# Patient Record
Sex: FEMALE
[De-identification: names, ages, dates, MRNs, and addresses within clinical notes are randomized per-mention and may not be internally consistent; named-entity substitution may affect disease eponyms.]

---

## 2017-06-20 ENCOUNTER — HOSPITAL ENCOUNTER (INPATIENT)
Dept: HOSPITAL 14 - H.ER | Age: 57
LOS: 13 days | Discharge: HOME | DRG: 26 | End: 2017-07-03
Attending: FAMILY MEDICINE | Admitting: FAMILY MEDICINE
Payer: MEDICARE

## 2017-06-20 VITALS — BODY MASS INDEX: 33.7 KG/M2

## 2017-06-20 DIAGNOSIS — F41.8: ICD-10-CM

## 2017-06-20 DIAGNOSIS — E11.622: ICD-10-CM

## 2017-06-20 DIAGNOSIS — R29.6: ICD-10-CM

## 2017-06-20 DIAGNOSIS — J44.9: ICD-10-CM

## 2017-06-20 DIAGNOSIS — L97.519: ICD-10-CM

## 2017-06-20 DIAGNOSIS — Z79.82: ICD-10-CM

## 2017-06-20 DIAGNOSIS — L97.419: ICD-10-CM

## 2017-06-20 DIAGNOSIS — N39.0: ICD-10-CM

## 2017-06-20 DIAGNOSIS — E78.00: ICD-10-CM

## 2017-06-20 DIAGNOSIS — I65.23: Primary | ICD-10-CM

## 2017-06-20 DIAGNOSIS — I50.9: ICD-10-CM

## 2017-06-20 DIAGNOSIS — Z81.8: ICD-10-CM

## 2017-06-20 DIAGNOSIS — Z90.49: ICD-10-CM

## 2017-06-20 DIAGNOSIS — K21.9: ICD-10-CM

## 2017-06-20 DIAGNOSIS — Z82.49: ICD-10-CM

## 2017-06-20 DIAGNOSIS — E11.51: ICD-10-CM

## 2017-06-20 DIAGNOSIS — Z82.5: ICD-10-CM

## 2017-06-20 DIAGNOSIS — Z83.3: ICD-10-CM

## 2017-06-20 DIAGNOSIS — E11.65: ICD-10-CM

## 2017-06-20 DIAGNOSIS — E11.69: ICD-10-CM

## 2017-06-20 DIAGNOSIS — Z80.0: ICD-10-CM

## 2017-06-20 DIAGNOSIS — Z79.02: ICD-10-CM

## 2017-06-20 DIAGNOSIS — E11.621: ICD-10-CM

## 2017-06-20 DIAGNOSIS — E11.649: ICD-10-CM

## 2017-06-20 DIAGNOSIS — Z79.4: ICD-10-CM

## 2017-06-20 DIAGNOSIS — E07.81: ICD-10-CM

## 2017-06-20 DIAGNOSIS — E66.9: ICD-10-CM

## 2017-06-20 DIAGNOSIS — Z79.899: ICD-10-CM

## 2017-06-20 DIAGNOSIS — E11.319: ICD-10-CM

## 2017-06-20 DIAGNOSIS — N17.9: ICD-10-CM

## 2017-06-20 DIAGNOSIS — E86.0: ICD-10-CM

## 2017-06-20 DIAGNOSIS — Z88.1: ICD-10-CM

## 2017-06-20 DIAGNOSIS — M86.8X7: ICD-10-CM

## 2017-06-20 DIAGNOSIS — I13.0: ICD-10-CM

## 2017-06-20 DIAGNOSIS — E11.22: ICD-10-CM

## 2017-06-20 DIAGNOSIS — E11.42: ICD-10-CM

## 2017-06-20 DIAGNOSIS — F17.210: ICD-10-CM

## 2017-06-20 DIAGNOSIS — I25.10: ICD-10-CM

## 2017-06-20 DIAGNOSIS — L08.9: ICD-10-CM

## 2017-06-20 LAB
ALBUMIN SERPL-MCNC: 3.3 G/DL (ref 3.5–5)
ALBUMIN SERPL-MCNC: 3.9 G/DL (ref 3.5–5)
ALBUMIN/GLOB SERPL: 0.9 {RATIO} (ref 1–2.1)
ALBUMIN/GLOB SERPL: 1 {RATIO} (ref 1–2.1)
ALT SERPL-CCNC: 36 U/L (ref 9–52)
ALT SERPL-CCNC: 43 U/L (ref 9–52)
APTT BLD: 36.5 SECONDS (ref 25.6–37.1)
AST SERPL-CCNC: 24 U/L (ref 14–36)
AST SERPL-CCNC: 32 U/L (ref 14–36)
BACTERIA #/AREA URNS HPF: (no result) /[HPF]
BASOPHILS # BLD AUTO: 0.2 K/UL (ref 0–0.2)
BASOPHILS NFR BLD: 1 % (ref 0–2)
BILIRUB UR-MCNC: NEGATIVE MG/DL
BUN SERPL-MCNC: 60 MG/DL (ref 7–17)
BUN SERPL-MCNC: 67 MG/DL (ref 7–17)
CALCIUM SERPL-MCNC: 8.3 MG/DL (ref 8.4–10.2)
CALCIUM SERPL-MCNC: 9.3 MG/DL (ref 8.4–10.2)
COLOR UR: YELLOW
EOSINOPHIL # BLD AUTO: 0.1 K/UL (ref 0–0.7)
EOSINOPHIL NFR BLD: 0.9 % (ref 0–4)
ERYTHROCYTE [DISTWIDTH] IN BLOOD BY AUTOMATED COUNT: 14.4 % (ref 11.5–14.5)
ERYTHROCYTE [DISTWIDTH] IN BLOOD BY AUTOMATED COUNT: 15.3 % (ref 11.5–14.5)
GFR NON-AFRICAN AMERICAN: 18
GFR NON-AFRICAN AMERICAN: 24
GLUCOSE UR STRIP-MCNC: >=500 MG/DL
HGB BLD-MCNC: 10.2 G/DL (ref 12–16)
HGB BLD-MCNC: 9.6 G/DL (ref 12–16)
INR PPP: 1.1 (ref 0.9–1.2)
LEUKOCYTE ESTERASE UR-ACNC: (no result) LEU/UL
LYMPHOCYTES # BLD AUTO: 2.5 K/UL (ref 1–4.3)
LYMPHOCYTES NFR BLD AUTO: 15.4 % (ref 20–40)
MCH RBC QN AUTO: 29.5 PG (ref 27–31)
MCH RBC QN AUTO: 30.7 PG (ref 27–31)
MCHC RBC AUTO-ENTMCNC: 31.3 G/DL (ref 33–37)
MCHC RBC AUTO-ENTMCNC: 33.3 G/DL (ref 33–37)
MCV RBC AUTO: 92.3 FL (ref 81–99)
MCV RBC AUTO: 94.1 FL (ref 81–99)
MONOCYTES # BLD: 1.5 K/UL (ref 0–0.8)
MONOCYTES NFR BLD: 9 % (ref 0–10)
NEUTROPHILS # BLD: 12.2 K/UL (ref 1.8–7)
NEUTROPHILS NFR BLD AUTO: 73.7 % (ref 50–75)
NRBC BLD AUTO-RTO: 0 % (ref 0–0)
PH UR STRIP: 5 [PH] (ref 5–8)
PLATELET # BLD: 172 K/UL (ref 130–400)
PLATELET # BLD: 219 K/UL (ref 130–400)
PMV BLD AUTO: 10.2 FL (ref 7.2–11.7)
PMV BLD AUTO: 10.4 FL (ref 7.2–11.7)
PROT UR STRIP-MCNC: 30 MG/DL
PROTHROMBIN TIME: 12.4 SECONDS (ref 9.8–13.1)
RBC # BLD AUTO: 3.25 MIL/UL (ref 3.8–5.2)
RBC # BLD AUTO: 3.31 MIL/UL (ref 3.8–5.2)
RBC # UR STRIP: (no result) /UL
SP GR UR STRIP: 1.01 (ref 1–1.03)
SQUAMOUS EPITHIAL: 1 /HPF (ref 0–5)
TROPONIN I SERPL-MCNC: 0.03 NG/ML (ref 0–0.12)
URINE CLARITY: (no result)
URINE HYALINE CAST: (no result) /HPF (ref 0–2)
URINE NITRATE: NEGATIVE
UROBILINOGEN UR-MCNC: (no result) MG/DL (ref 0.2–1)
WBC # BLD AUTO: 11.4 K/UL (ref 4.8–10.8)
WBC # BLD AUTO: 16.6 K/UL (ref 4.8–10.8)
WBC CLUMPS # UR AUTO: (no result) /HPF

## 2017-06-21 LAB
ALBUMIN SERPL-MCNC: 3.3 G/DL (ref 3.5–5)
ALBUMIN/GLOB SERPL: 0.8 {RATIO} (ref 1–2.1)
ALT SERPL-CCNC: 45 U/L (ref 9–52)
AST SERPL-CCNC: 34 U/L (ref 14–36)
BASOPHILS # BLD AUTO: 0 K/UL (ref 0–0.2)
BASOPHILS NFR BLD: 0.5 % (ref 0–2)
BUN SERPL-MCNC: 40 MG/DL (ref 7–17)
CALCIUM SERPL-MCNC: 8.5 MG/DL (ref 8.4–10.2)
EOSINOPHIL # BLD AUTO: 0.1 K/UL (ref 0–0.7)
EOSINOPHIL NFR BLD: 1.2 % (ref 0–4)
ERYTHROCYTE [DISTWIDTH] IN BLOOD BY AUTOMATED COUNT: 14.7 % (ref 11.5–14.5)
GFR NON-AFRICAN AMERICAN: 42
HGB BLD-MCNC: 9.8 G/DL (ref 12–16)
LYMPHOCYTES # BLD AUTO: 1.4 K/UL (ref 1–4.3)
LYMPHOCYTES NFR BLD AUTO: 16.3 % (ref 20–40)
MCH RBC QN AUTO: 30.8 PG (ref 27–31)
MCHC RBC AUTO-ENTMCNC: 33.3 G/DL (ref 33–37)
MCV RBC AUTO: 92.5 FL (ref 81–99)
MONOCYTES # BLD: 0.7 K/UL (ref 0–0.8)
MONOCYTES NFR BLD: 8.8 % (ref 0–10)
NEUTROPHILS # BLD: 6.1 K/UL (ref 1.8–7)
NEUTROPHILS NFR BLD AUTO: 73.2 % (ref 50–75)
NRBC BLD AUTO-RTO: 0.1 % (ref 0–0)
PLATELET # BLD: 175 K/UL (ref 130–400)
PMV BLD AUTO: 10.5 FL (ref 7.2–11.7)
RBC # BLD AUTO: 3.2 MIL/UL (ref 3.8–5.2)
WBC # BLD AUTO: 8.3 K/UL (ref 4.8–10.8)

## 2017-06-21 RX ADMIN — INSULIN DETEMIR SCH: 100 INJECTION, SOLUTION SUBCUTANEOUS at 22:00

## 2017-06-21 RX ADMIN — INSULIN LISPRO SCH: 100 INJECTION, SOLUTION INTRAVENOUS; SUBCUTANEOUS at 18:24

## 2017-06-21 RX ADMIN — INSULIN LISPRO SCH U: 100 INJECTION, SOLUTION INTRAVENOUS; SUBCUTANEOUS at 18:23

## 2017-06-21 RX ADMIN — INSULIN LISPRO SCH UNITS: 100 INJECTION, SOLUTION INTRAVENOUS; SUBCUTANEOUS at 06:37

## 2017-06-21 RX ADMIN — INSULIN LISPRO SCH: 100 INJECTION, SOLUTION INTRAVENOUS; SUBCUTANEOUS at 21:35

## 2017-06-21 RX ADMIN — INSULIN LISPRO SCH UNITS: 100 INJECTION, SOLUTION INTRAVENOUS; SUBCUTANEOUS at 12:57

## 2017-06-21 RX ADMIN — Medication SCH APPLIC: at 18:25

## 2017-06-22 LAB
ALBUMIN SERPL-MCNC: 3.7 G/DL (ref 3.5–5)
ALBUMIN/GLOB SERPL: 0.9 {RATIO} (ref 1–2.1)
ALT SERPL-CCNC: 51 U/L (ref 9–52)
AST SERPL-CCNC: 42 U/L (ref 14–36)
BUN SERPL-MCNC: 21 MG/DL (ref 7–17)
CALCIUM SERPL-MCNC: 9.2 MG/DL (ref 8.4–10.2)
GFR NON-AFRICAN AMERICAN: > 60
HDLC SERPL-MCNC: 27 MG/DL (ref 30–70)
LDLC SERPL-MCNC: 93 MG/DL (ref 0–129)

## 2017-06-22 RX ADMIN — INSULIN LISPRO SCH: 100 INJECTION, SOLUTION INTRAVENOUS; SUBCUTANEOUS at 11:30

## 2017-06-22 RX ADMIN — INSULIN DETEMIR SCH UNITS: 100 INJECTION, SOLUTION SUBCUTANEOUS at 22:13

## 2017-06-22 RX ADMIN — INSULIN LISPRO SCH U: 100 INJECTION, SOLUTION INTRAVENOUS; SUBCUTANEOUS at 08:59

## 2017-06-22 RX ADMIN — INSULIN LISPRO SCH U: 100 INJECTION, SOLUTION INTRAVENOUS; SUBCUTANEOUS at 16:37

## 2017-06-22 RX ADMIN — Medication SCH APPLIC: at 09:00

## 2017-06-22 RX ADMIN — Medication SCH APPLIC: at 16:39

## 2017-06-22 RX ADMIN — INSULIN LISPRO SCH: 100 INJECTION, SOLUTION INTRAVENOUS; SUBCUTANEOUS at 06:51

## 2017-06-22 RX ADMIN — INSULIN LISPRO SCH: 100 INJECTION, SOLUTION INTRAVENOUS; SUBCUTANEOUS at 22:13

## 2017-06-22 RX ADMIN — INSULIN LISPRO SCH: 100 INJECTION, SOLUTION INTRAVENOUS; SUBCUTANEOUS at 16:35

## 2017-06-22 RX ADMIN — INSULIN LISPRO SCH: 100 INJECTION, SOLUTION INTRAVENOUS; SUBCUTANEOUS at 12:00

## 2017-06-23 RX ADMIN — INSULIN LISPRO SCH UNITS: 100 INJECTION, SOLUTION INTRAVENOUS; SUBCUTANEOUS at 16:29

## 2017-06-23 RX ADMIN — INSULIN LISPRO SCH: 100 INJECTION, SOLUTION INTRAVENOUS; SUBCUTANEOUS at 08:00

## 2017-06-23 RX ADMIN — INSULIN LISPRO SCH: 100 INJECTION, SOLUTION INTRAVENOUS; SUBCUTANEOUS at 16:28

## 2017-06-23 RX ADMIN — INSULIN DETEMIR SCH UNITS: 100 INJECTION, SOLUTION SUBCUTANEOUS at 22:25

## 2017-06-23 RX ADMIN — INSULIN LISPRO SCH UNITS: 100 INJECTION, SOLUTION INTRAVENOUS; SUBCUTANEOUS at 12:45

## 2017-06-23 RX ADMIN — Medication SCH APPLIC: at 18:41

## 2017-06-23 RX ADMIN — INSULIN LISPRO SCH: 100 INJECTION, SOLUTION INTRAVENOUS; SUBCUTANEOUS at 09:30

## 2017-06-23 RX ADMIN — INSULIN LISPRO SCH: 100 INJECTION, SOLUTION INTRAVENOUS; SUBCUTANEOUS at 12:47

## 2017-06-23 RX ADMIN — Medication SCH APPLIC: at 09:29

## 2017-06-23 RX ADMIN — INSULIN LISPRO SCH: 100 INJECTION, SOLUTION INTRAVENOUS; SUBCUTANEOUS at 22:24

## 2017-06-24 RX ADMIN — INSULIN LISPRO SCH UNITS: 100 INJECTION, SOLUTION INTRAVENOUS; SUBCUTANEOUS at 17:00

## 2017-06-24 RX ADMIN — INSULIN LISPRO SCH: 100 INJECTION, SOLUTION INTRAVENOUS; SUBCUTANEOUS at 22:45

## 2017-06-24 RX ADMIN — INSULIN LISPRO SCH: 100 INJECTION, SOLUTION INTRAVENOUS; SUBCUTANEOUS at 17:01

## 2017-06-24 RX ADMIN — INSULIN LISPRO SCH UNITS: 100 INJECTION, SOLUTION INTRAVENOUS; SUBCUTANEOUS at 08:00

## 2017-06-24 RX ADMIN — INSULIN DETEMIR SCH UNITS: 100 INJECTION, SOLUTION SUBCUTANEOUS at 22:43

## 2017-06-24 RX ADMIN — Medication SCH APPLIC: at 09:36

## 2017-06-24 RX ADMIN — INSULIN LISPRO SCH: 100 INJECTION, SOLUTION INTRAVENOUS; SUBCUTANEOUS at 11:45

## 2017-06-24 RX ADMIN — INSULIN LISPRO SCH UNITS: 100 INJECTION, SOLUTION INTRAVENOUS; SUBCUTANEOUS at 11:45

## 2017-06-24 RX ADMIN — INSULIN LISPRO SCH: 100 INJECTION, SOLUTION INTRAVENOUS; SUBCUTANEOUS at 07:45

## 2017-06-25 LAB
ALBUMIN SERPL-MCNC: 3.7 G/DL (ref 3.5–5)
ALBUMIN/GLOB SERPL: 0.8 {RATIO} (ref 1–2.1)
ALT SERPL-CCNC: 48 U/L (ref 9–52)
AST SERPL-CCNC: 41 U/L (ref 14–36)
BASOPHILS # BLD AUTO: 0.1 K/UL (ref 0–0.2)
BASOPHILS NFR BLD: 0.9 % (ref 0–2)
BUN SERPL-MCNC: 15 MG/DL (ref 7–17)
CALCIUM SERPL-MCNC: 9.4 MG/DL (ref 8.4–10.2)
EOSINOPHIL # BLD AUTO: 0.1 K/UL (ref 0–0.7)
EOSINOPHIL NFR BLD: 1.1 % (ref 0–4)
ERYTHROCYTE [DISTWIDTH] IN BLOOD BY AUTOMATED COUNT: 14.7 % (ref 11.5–14.5)
GFR NON-AFRICAN AMERICAN: > 60
HGB BLD-MCNC: 11.8 G/DL (ref 12–16)
LYMPHOCYTES # BLD AUTO: 1.4 K/UL (ref 1–4.3)
LYMPHOCYTES NFR BLD AUTO: 19.2 % (ref 20–40)
MCH RBC QN AUTO: 30.9 PG (ref 27–31)
MCHC RBC AUTO-ENTMCNC: 33.5 G/DL (ref 33–37)
MCV RBC AUTO: 92.2 FL (ref 81–99)
MONOCYTES # BLD: 0.7 K/UL (ref 0–0.8)
MONOCYTES NFR BLD: 9.6 % (ref 0–10)
NEUTROPHILS # BLD: 5 K/UL (ref 1.8–7)
NEUTROPHILS NFR BLD AUTO: 69.2 % (ref 50–75)
NRBC BLD AUTO-RTO: 0.1 % (ref 0–0)
PLATELET # BLD: 230 K/UL (ref 130–400)
PMV BLD AUTO: 9.3 FL (ref 7.2–11.7)
RBC # BLD AUTO: 3.82 MIL/UL (ref 3.8–5.2)
WBC # BLD AUTO: 7.2 K/UL (ref 4.8–10.8)

## 2017-06-25 RX ADMIN — INSULIN LISPRO SCH UNITS: 100 INJECTION, SOLUTION INTRAVENOUS; SUBCUTANEOUS at 08:19

## 2017-06-25 RX ADMIN — INSULIN LISPRO SCH: 100 INJECTION, SOLUTION INTRAVENOUS; SUBCUTANEOUS at 07:48

## 2017-06-25 RX ADMIN — INSULIN LISPRO SCH: 100 INJECTION, SOLUTION INTRAVENOUS; SUBCUTANEOUS at 12:32

## 2017-06-25 RX ADMIN — INSULIN LISPRO SCH: 100 INJECTION, SOLUTION INTRAVENOUS; SUBCUTANEOUS at 18:33

## 2017-06-25 RX ADMIN — INSULIN DETEMIR SCH UNITS: 100 INJECTION, SOLUTION SUBCUTANEOUS at 21:53

## 2017-06-25 RX ADMIN — Medication SCH APPLIC: at 09:50

## 2017-06-25 RX ADMIN — INSULIN LISPRO SCH UNITS: 100 INJECTION, SOLUTION INTRAVENOUS; SUBCUTANEOUS at 17:06

## 2017-06-25 RX ADMIN — INSULIN LISPRO SCH: 100 INJECTION, SOLUTION INTRAVENOUS; SUBCUTANEOUS at 21:23

## 2017-06-25 RX ADMIN — Medication SCH APPLIC: at 18:33

## 2017-06-25 RX ADMIN — INSULIN LISPRO SCH: 100 INJECTION, SOLUTION INTRAVENOUS; SUBCUTANEOUS at 11:47

## 2017-06-26 LAB
ALBUMIN SERPL-MCNC: 3.9 G/DL (ref 3.5–5)
ALBUMIN/GLOB SERPL: 0.9 {RATIO} (ref 1–2.1)
ALT SERPL-CCNC: 59 U/L (ref 9–52)
AST SERPL-CCNC: 42 U/L (ref 14–36)
BUN SERPL-MCNC: 18 MG/DL (ref 7–17)
CALCIUM SERPL-MCNC: 9.4 MG/DL (ref 8.4–10.2)
ERYTHROCYTE [DISTWIDTH] IN BLOOD BY AUTOMATED COUNT: 14.8 % (ref 11.5–14.5)
GFR NON-AFRICAN AMERICAN: > 60
HGB BLD-MCNC: 11.7 G/DL (ref 12–16)
MCH RBC QN AUTO: 30.4 PG (ref 27–31)
MCHC RBC AUTO-ENTMCNC: 32.9 G/DL (ref 33–37)
MCV RBC AUTO: 92.3 FL (ref 81–99)
PLATELET # BLD: 237 K/UL (ref 130–400)
RBC # BLD AUTO: 3.85 MIL/UL (ref 3.8–5.2)
WBC # BLD AUTO: 8.1 K/UL (ref 4.8–10.8)

## 2017-06-26 RX ADMIN — Medication SCH APPLIC: at 08:56

## 2017-06-26 RX ADMIN — INSULIN LISPRO SCH: 100 INJECTION, SOLUTION INTRAVENOUS; SUBCUTANEOUS at 07:33

## 2017-06-26 RX ADMIN — INSULIN LISPRO SCH: 100 INJECTION, SOLUTION INTRAVENOUS; SUBCUTANEOUS at 07:38

## 2017-06-26 RX ADMIN — INSULIN LISPRO SCH: 100 INJECTION, SOLUTION INTRAVENOUS; SUBCUTANEOUS at 17:18

## 2017-06-26 RX ADMIN — INSULIN DETEMIR SCH UNITS: 100 INJECTION, SOLUTION SUBCUTANEOUS at 21:40

## 2017-06-26 RX ADMIN — INSULIN LISPRO SCH: 100 INJECTION, SOLUTION INTRAVENOUS; SUBCUTANEOUS at 21:33

## 2017-06-26 RX ADMIN — Medication SCH APPLIC: at 17:17

## 2017-06-26 RX ADMIN — INSULIN LISPRO SCH: 100 INJECTION, SOLUTION INTRAVENOUS; SUBCUTANEOUS at 13:11

## 2017-06-26 RX ADMIN — INSULIN LISPRO SCH UNITS: 100 INJECTION, SOLUTION INTRAVENOUS; SUBCUTANEOUS at 17:18

## 2017-06-26 RX ADMIN — INSULIN LISPRO SCH UNITS: 100 INJECTION, SOLUTION INTRAVENOUS; SUBCUTANEOUS at 13:12

## 2017-06-27 LAB
ALBUMIN SERPL-MCNC: 3.8 G/DL (ref 3.5–5)
ALBUMIN/GLOB SERPL: 0.9 {RATIO} (ref 1–2.1)
ALT SERPL-CCNC: 58 U/L (ref 9–52)
AST SERPL-CCNC: 49 U/L (ref 14–36)
BUN SERPL-MCNC: 18 MG/DL (ref 7–17)
CALCIUM SERPL-MCNC: 9.2 MG/DL (ref 8.4–10.2)
ERYTHROCYTE [DISTWIDTH] IN BLOOD BY AUTOMATED COUNT: 14.8 % (ref 11.5–14.5)
GFR NON-AFRICAN AMERICAN: > 60
HGB BLD-MCNC: 12.1 G/DL (ref 12–16)
MCH RBC QN AUTO: 30.2 PG (ref 27–31)
MCHC RBC AUTO-ENTMCNC: 32.2 G/DL (ref 33–37)
MCV RBC AUTO: 93.7 FL (ref 81–99)
PLATELET # BLD: 205 K/UL (ref 130–400)
RBC # BLD AUTO: 4 MIL/UL (ref 3.8–5.2)
WBC # BLD AUTO: 7.2 K/UL (ref 4.8–10.8)

## 2017-06-27 RX ADMIN — INSULIN LISPRO SCH UNITS: 100 INJECTION, SOLUTION INTRAVENOUS; SUBCUTANEOUS at 17:36

## 2017-06-27 RX ADMIN — INSULIN LISPRO SCH: 100 INJECTION, SOLUTION INTRAVENOUS; SUBCUTANEOUS at 17:35

## 2017-06-27 RX ADMIN — INSULIN LISPRO SCH: 100 INJECTION, SOLUTION INTRAVENOUS; SUBCUTANEOUS at 21:32

## 2017-06-27 RX ADMIN — INSULIN LISPRO SCH: 100 INJECTION, SOLUTION INTRAVENOUS; SUBCUTANEOUS at 12:44

## 2017-06-27 RX ADMIN — INSULIN DETEMIR SCH UNITS: 100 INJECTION, SOLUTION SUBCUTANEOUS at 22:23

## 2017-06-27 RX ADMIN — INSULIN LISPRO SCH UNITS: 100 INJECTION, SOLUTION INTRAVENOUS; SUBCUTANEOUS at 09:18

## 2017-06-27 RX ADMIN — INSULIN LISPRO SCH: 100 INJECTION, SOLUTION INTRAVENOUS; SUBCUTANEOUS at 07:23

## 2017-06-27 RX ADMIN — INSULIN LISPRO SCH UNITS: 100 INJECTION, SOLUTION INTRAVENOUS; SUBCUTANEOUS at 12:45

## 2017-06-28 RX ADMIN — INSULIN LISPRO SCH UNITS: 100 INJECTION, SOLUTION INTRAVENOUS; SUBCUTANEOUS at 16:51

## 2017-06-28 RX ADMIN — INSULIN LISPRO SCH: 100 INJECTION, SOLUTION INTRAVENOUS; SUBCUTANEOUS at 22:32

## 2017-06-28 RX ADMIN — Medication SCH: at 08:53

## 2017-06-28 RX ADMIN — INSULIN LISPRO SCH: 100 INJECTION, SOLUTION INTRAVENOUS; SUBCUTANEOUS at 12:09

## 2017-06-28 RX ADMIN — INSULIN LISPRO SCH UNITS: 100 INJECTION, SOLUTION INTRAVENOUS; SUBCUTANEOUS at 12:59

## 2017-06-28 RX ADMIN — INSULIN DETEMIR SCH UNITS: 100 INJECTION, SOLUTION SUBCUTANEOUS at 22:37

## 2017-06-28 RX ADMIN — INSULIN LISPRO SCH: 100 INJECTION, SOLUTION INTRAVENOUS; SUBCUTANEOUS at 16:44

## 2017-06-28 RX ADMIN — INSULIN LISPRO SCH: 100 INJECTION, SOLUTION INTRAVENOUS; SUBCUTANEOUS at 06:47

## 2017-06-28 RX ADMIN — INSULIN LISPRO SCH UNITS: 100 INJECTION, SOLUTION INTRAVENOUS; SUBCUTANEOUS at 08:53

## 2017-06-29 LAB
APTT BLD: 41.5 SECONDS (ref 25.6–37.1)
BASOPHILS # BLD AUTO: 0.1 K/UL (ref 0–0.2)
BASOPHILS NFR BLD: 1.2 % (ref 0–2)
BUN SERPL-MCNC: 15 MG/DL (ref 7–17)
CALCIUM SERPL-MCNC: 9.6 MG/DL (ref 8.4–10.2)
EOSINOPHIL # BLD AUTO: 0.1 K/UL (ref 0–0.7)
EOSINOPHIL NFR BLD: 1.2 % (ref 0–4)
ERYTHROCYTE [DISTWIDTH] IN BLOOD BY AUTOMATED COUNT: 14.7 % (ref 11.5–14.5)
GFR NON-AFRICAN AMERICAN: 57
HGB BLD-MCNC: 12.3 G/DL (ref 12–16)
INR PPP: 1 (ref 0.9–1.2)
LYMPHOCYTES # BLD AUTO: 1.1 K/UL (ref 1–4.3)
LYMPHOCYTES NFR BLD AUTO: 17.3 % (ref 20–40)
MCH RBC QN AUTO: 30.3 PG (ref 27–31)
MCHC RBC AUTO-ENTMCNC: 32.4 G/DL (ref 33–37)
MCV RBC AUTO: 93.4 FL (ref 81–99)
MONOCYTES # BLD: 0.6 K/UL (ref 0–0.8)
MONOCYTES NFR BLD: 9.3 % (ref 0–10)
NEUTROPHILS # BLD: 4.6 K/UL (ref 1.8–7)
NEUTROPHILS NFR BLD AUTO: 71 % (ref 50–75)
NRBC BLD AUTO-RTO: 0.1 % (ref 0–0)
PLATELET # BLD: 201 K/UL (ref 130–400)
PMV BLD AUTO: 9.6 FL (ref 7.2–11.7)
PROTHROMBIN TIME: 11.6 SECONDS (ref 9.8–13.1)
RBC # BLD AUTO: 4.07 MIL/UL (ref 3.8–5.2)
WBC # BLD AUTO: 6.5 K/UL (ref 4.8–10.8)

## 2017-06-29 PROCEDURE — 03CJ3ZZ EXTIRPATION OF MATTER FROM LEFT COMMON CAROTID ARTERY, PERCUTANEOUS APPROACH: ICD-10-PCS

## 2017-06-29 PROCEDURE — B5181ZA FLUOROSCOPY OF SUPERIOR VENA CAVA USING LOW OSMOLAR CONTRAST, GUIDANCE: ICD-10-PCS

## 2017-06-29 PROCEDURE — 037J3ZZ DILATION OF LEFT COMMON CAROTID ARTERY, PERCUTANEOUS APPROACH: ICD-10-PCS | Performed by: SURGERY

## 2017-06-29 RX ADMIN — INSULIN LISPRO SCH: 100 INJECTION, SOLUTION INTRAVENOUS; SUBCUTANEOUS at 12:08

## 2017-06-29 RX ADMIN — INSULIN LISPRO SCH UNITS: 100 INJECTION, SOLUTION INTRAVENOUS; SUBCUTANEOUS at 08:51

## 2017-06-29 RX ADMIN — INSULIN DETEMIR SCH UNITS: 100 INJECTION, SOLUTION SUBCUTANEOUS at 22:31

## 2017-06-29 RX ADMIN — INSULIN LISPRO SCH UNITS: 100 INJECTION, SOLUTION INTRAVENOUS; SUBCUTANEOUS at 12:00

## 2017-06-29 RX ADMIN — INSULIN LISPRO SCH: 100 INJECTION, SOLUTION INTRAVENOUS; SUBCUTANEOUS at 08:50

## 2017-06-29 RX ADMIN — INSULIN LISPRO SCH: 100 INJECTION, SOLUTION INTRAVENOUS; SUBCUTANEOUS at 22:30

## 2017-06-29 RX ADMIN — OXYCODONE HYDROCHLORIDE AND ACETAMINOPHEN PRN TAB: 5; 325 TABLET ORAL at 22:40

## 2017-06-29 RX ADMIN — Medication SCH: at 09:46

## 2017-06-30 LAB
BUN SERPL-MCNC: 15 MG/DL (ref 7–17)
CALCIUM SERPL-MCNC: 8.9 MG/DL (ref 8.4–10.2)
ERYTHROCYTE [DISTWIDTH] IN BLOOD BY AUTOMATED COUNT: 14.7 % (ref 11.5–14.5)
GFR NON-AFRICAN AMERICAN: > 60
HGB BLD-MCNC: 11 G/DL (ref 12–16)
MCH RBC QN AUTO: 31 PG (ref 27–31)
MCHC RBC AUTO-ENTMCNC: 33.2 G/DL (ref 33–37)
MCV RBC AUTO: 93.3 FL (ref 81–99)
PLATELET # BLD: 184 K/UL (ref 130–400)
RBC # BLD AUTO: 3.55 MIL/UL (ref 3.8–5.2)
WBC # BLD AUTO: 8.9 K/UL (ref 4.8–10.8)

## 2017-06-30 RX ADMIN — INSULIN LISPRO SCH: 100 INJECTION, SOLUTION INTRAVENOUS; SUBCUTANEOUS at 06:31

## 2017-06-30 RX ADMIN — INSULIN LISPRO SCH UNITS: 100 INJECTION, SOLUTION INTRAVENOUS; SUBCUTANEOUS at 08:05

## 2017-06-30 RX ADMIN — INSULIN LISPRO SCH UNITS: 100 INJECTION, SOLUTION INTRAVENOUS; SUBCUTANEOUS at 08:03

## 2017-06-30 RX ADMIN — Medication SCH: at 08:25

## 2017-06-30 RX ADMIN — INSULIN LISPRO SCH: 100 INJECTION, SOLUTION INTRAVENOUS; SUBCUTANEOUS at 21:15

## 2017-06-30 RX ADMIN — INSULIN LISPRO SCH: 100 INJECTION, SOLUTION INTRAVENOUS; SUBCUTANEOUS at 17:18

## 2017-06-30 RX ADMIN — INSULIN LISPRO SCH UNITS: 100 INJECTION, SOLUTION INTRAVENOUS; SUBCUTANEOUS at 17:23

## 2017-06-30 RX ADMIN — INSULIN DETEMIR SCH UNITS: 100 INJECTION, SOLUTION SUBCUTANEOUS at 21:12

## 2017-06-30 RX ADMIN — INSULIN LISPRO SCH: 100 INJECTION, SOLUTION INTRAVENOUS; SUBCUTANEOUS at 11:44

## 2017-06-30 RX ADMIN — INSULIN LISPRO SCH UNITS: 100 INJECTION, SOLUTION INTRAVENOUS; SUBCUTANEOUS at 12:17

## 2017-06-30 RX ADMIN — OXYCODONE HYDROCHLORIDE AND ACETAMINOPHEN PRN TAB: 5; 325 TABLET ORAL at 20:22

## 2017-07-01 RX ADMIN — INSULIN DETEMIR SCH UNITS: 100 INJECTION, SOLUTION SUBCUTANEOUS at 21:42

## 2017-07-01 RX ADMIN — INSULIN LISPRO SCH: 100 INJECTION, SOLUTION INTRAVENOUS; SUBCUTANEOUS at 12:19

## 2017-07-01 RX ADMIN — Medication SCH: at 08:12

## 2017-07-01 RX ADMIN — INSULIN LISPRO SCH UNITS: 100 INJECTION, SOLUTION INTRAVENOUS; SUBCUTANEOUS at 08:09

## 2017-07-01 RX ADMIN — OXYCODONE HYDROCHLORIDE AND ACETAMINOPHEN PRN TAB: 5; 325 TABLET ORAL at 02:50

## 2017-07-01 RX ADMIN — INSULIN LISPRO SCH: 100 INJECTION, SOLUTION INTRAVENOUS; SUBCUTANEOUS at 06:32

## 2017-07-01 RX ADMIN — INSULIN LISPRO SCH UNITS: 100 INJECTION, SOLUTION INTRAVENOUS; SUBCUTANEOUS at 12:00

## 2017-07-01 RX ADMIN — INSULIN LISPRO SCH: 100 INJECTION, SOLUTION INTRAVENOUS; SUBCUTANEOUS at 22:00

## 2017-07-02 RX ADMIN — OXYCODONE HYDROCHLORIDE AND ACETAMINOPHEN PRN TAB: 5; 325 TABLET ORAL at 18:03

## 2017-07-02 RX ADMIN — CLINDAMYCIN PHOSPHATE SCH MLS/HR: 150 INJECTION, SOLUTION INTRAVENOUS at 00:34

## 2017-07-02 RX ADMIN — INSULIN LISPRO SCH: 100 INJECTION, SOLUTION INTRAVENOUS; SUBCUTANEOUS at 17:51

## 2017-07-02 RX ADMIN — INSULIN LISPRO SCH: 100 INJECTION, SOLUTION INTRAVENOUS; SUBCUTANEOUS at 06:31

## 2017-07-02 RX ADMIN — INSULIN LISPRO SCH: 100 INJECTION, SOLUTION INTRAVENOUS; SUBCUTANEOUS at 22:35

## 2017-07-02 RX ADMIN — OXYCODONE HYDROCHLORIDE AND ACETAMINOPHEN PRN TAB: 5; 325 TABLET ORAL at 23:10

## 2017-07-02 RX ADMIN — CLINDAMYCIN PHOSPHATE SCH MLS/HR: 150 INJECTION, SOLUTION INTRAVENOUS at 08:18

## 2017-07-02 RX ADMIN — INSULIN LISPRO SCH UNIT: 100 INJECTION, SOLUTION INTRAVENOUS; SUBCUTANEOUS at 12:46

## 2017-07-02 RX ADMIN — Medication SCH APPLIC: at 08:22

## 2017-07-02 RX ADMIN — CLINDAMYCIN PHOSPHATE SCH MLS/HR: 150 INJECTION, SOLUTION INTRAVENOUS at 17:50

## 2017-07-02 RX ADMIN — INSULIN LISPRO SCH UNITS: 100 INJECTION, SOLUTION INTRAVENOUS; SUBCUTANEOUS at 12:46

## 2017-07-02 RX ADMIN — INSULIN LISPRO SCH UNITS: 100 INJECTION, SOLUTION INTRAVENOUS; SUBCUTANEOUS at 17:51

## 2017-07-02 RX ADMIN — INSULIN LISPRO SCH UNITS: 100 INJECTION, SOLUTION INTRAVENOUS; SUBCUTANEOUS at 08:20

## 2017-07-03 VITALS
OXYGEN SATURATION: 96 % | HEART RATE: 83 BPM | DIASTOLIC BLOOD PRESSURE: 63 MMHG | SYSTOLIC BLOOD PRESSURE: 106 MMHG | TEMPERATURE: 98.2 F

## 2017-07-03 VITALS — RESPIRATION RATE: 18 BRPM

## 2017-07-03 LAB
ALBUMIN SERPL-MCNC: 4 G/DL (ref 3.5–5)
ALBUMIN/GLOB SERPL: 0.9 {RATIO} (ref 1–2.1)
ALT SERPL-CCNC: 74 U/L (ref 9–52)
AST SERPL-CCNC: 67 U/L (ref 14–36)
BUN SERPL-MCNC: 22 MG/DL (ref 7–17)
CALCIUM SERPL-MCNC: 10.1 MG/DL (ref 8.4–10.2)
GFR NON-AFRICAN AMERICAN: 51

## 2017-07-03 PROCEDURE — 02HV33Z INSERTION OF INFUSION DEVICE INTO SUPERIOR VENA CAVA, PERCUTANEOUS APPROACH: ICD-10-PCS

## 2017-07-03 RX ADMIN — INSULIN LISPRO SCH UNITS: 100 INJECTION, SOLUTION INTRAVENOUS; SUBCUTANEOUS at 11:51

## 2017-07-03 RX ADMIN — INSULIN LISPRO SCH: 100 INJECTION, SOLUTION INTRAVENOUS; SUBCUTANEOUS at 11:50

## 2017-07-03 RX ADMIN — INSULIN LISPRO SCH: 100 INJECTION, SOLUTION INTRAVENOUS; SUBCUTANEOUS at 09:11

## 2017-07-03 RX ADMIN — CLINDAMYCIN PHOSPHATE SCH MLS/HR: 150 INJECTION, SOLUTION INTRAVENOUS at 00:39

## 2017-07-03 RX ADMIN — Medication SCH APPLIC: at 09:12

## 2017-07-03 RX ADMIN — INSULIN LISPRO SCH UNITS: 100 INJECTION, SOLUTION INTRAVENOUS; SUBCUTANEOUS at 09:11

## 2017-07-03 RX ADMIN — OXYCODONE HYDROCHLORIDE AND ACETAMINOPHEN PRN TAB: 5; 325 TABLET ORAL at 09:28

## 2017-07-03 RX ADMIN — CLINDAMYCIN PHOSPHATE SCH MLS/HR: 150 INJECTION, SOLUTION INTRAVENOUS at 09:09

## 2017-08-10 ENCOUNTER — HOSPITAL ENCOUNTER (INPATIENT)
Dept: HOSPITAL 14 - H.ER | Age: 57
LOS: 5 days | Discharge: HOME | DRG: 872 | End: 2017-08-15
Attending: FAMILY MEDICINE | Admitting: FAMILY MEDICINE
Payer: MEDICARE

## 2017-08-10 VITALS — BODY MASS INDEX: 33.7 KG/M2

## 2017-08-10 DIAGNOSIS — Z79.4: ICD-10-CM

## 2017-08-10 DIAGNOSIS — N18.9: ICD-10-CM

## 2017-08-10 DIAGNOSIS — F17.210: ICD-10-CM

## 2017-08-10 DIAGNOSIS — I12.9: ICD-10-CM

## 2017-08-10 DIAGNOSIS — E11.319: ICD-10-CM

## 2017-08-10 DIAGNOSIS — N39.0: ICD-10-CM

## 2017-08-10 DIAGNOSIS — Z87.39: ICD-10-CM

## 2017-08-10 DIAGNOSIS — E78.5: ICD-10-CM

## 2017-08-10 DIAGNOSIS — J45.909: ICD-10-CM

## 2017-08-10 DIAGNOSIS — D53.9: ICD-10-CM

## 2017-08-10 DIAGNOSIS — Z86.79: ICD-10-CM

## 2017-08-10 DIAGNOSIS — E11.621: ICD-10-CM

## 2017-08-10 DIAGNOSIS — K21.9: ICD-10-CM

## 2017-08-10 DIAGNOSIS — A41.9: Primary | ICD-10-CM

## 2017-08-10 DIAGNOSIS — Z79.02: ICD-10-CM

## 2017-08-10 DIAGNOSIS — E11.22: ICD-10-CM

## 2017-08-10 DIAGNOSIS — E11.21: ICD-10-CM

## 2017-08-10 DIAGNOSIS — E11.42: ICD-10-CM

## 2017-08-10 DIAGNOSIS — E66.9: ICD-10-CM

## 2017-08-10 DIAGNOSIS — Z79.82: ICD-10-CM

## 2017-08-10 DIAGNOSIS — E86.0: ICD-10-CM

## 2017-08-10 DIAGNOSIS — N17.9: ICD-10-CM

## 2017-08-10 DIAGNOSIS — Z88.1: ICD-10-CM

## 2017-08-10 DIAGNOSIS — E11.649: ICD-10-CM

## 2017-08-10 DIAGNOSIS — I25.10: ICD-10-CM

## 2017-08-10 DIAGNOSIS — L97.419: ICD-10-CM

## 2017-08-10 DIAGNOSIS — E78.00: ICD-10-CM

## 2017-08-10 DIAGNOSIS — I73.9: ICD-10-CM

## 2017-08-10 LAB
ANISOCYTOSIS BLD QL SMEAR: SLIGHT
BACTERIA #/AREA URNS HPF: (no result) /[HPF]
BASE EXCESS BLDV CALC-SCNC: 0.1 MMOL/L (ref 0–2)
BASOPHILS # BLD AUTO: 0 K/UL (ref 0–0.2)
BASOPHILS NFR BLD: 0.1 % (ref 0–2)
BILIRUB UR-MCNC: NEGATIVE MG/DL
BUN SERPL-MCNC: 79 MG/DL (ref 7–17)
CALCIUM SERPL-MCNC: 9.7 MG/DL (ref 8.4–10.2)
COLOR UR: YELLOW
EOSINOPHIL # BLD AUTO: 0.1 K/UL (ref 0–0.7)
EOSINOPHIL NFR BLD: 0.9 % (ref 0–4)
EOSINOPHIL NFR BLD: 2 % (ref 0–7)
ERYTHROCYTE [DISTWIDTH] IN BLOOD BY AUTOMATED COUNT: 14.8 % (ref 11.5–14.5)
GFR NON-AFRICAN AMERICAN: 22
GLUCOSE UR STRIP-MCNC: 150 MG/DL
HGB BLD-MCNC: 9.3 G/DL (ref 12–16)
LEUKOCYTE ESTERASE UR-ACNC: (no result) LEU/UL
LG PLATELETS BLD QL SMEAR: PRESENT
LYMPHOCYTE: 5 % (ref 20–50)
LYMPHOCYTES # BLD AUTO: 1.1 K/UL (ref 1–4.3)
LYMPHOCYTES NFR BLD AUTO: 6.6 % (ref 20–40)
MACROCYTES BLD QL SMEAR: SLIGHT
MCH RBC QN AUTO: 29.5 PG (ref 27–31)
MCHC RBC AUTO-ENTMCNC: 33.7 G/DL (ref 33–37)
MCV RBC AUTO: 87.6 FL (ref 81–99)
MONOCYTE: 3 % (ref 0–10)
MONOCYTES # BLD: 1.1 K/UL (ref 0–0.8)
MONOCYTES NFR BLD: 6.5 % (ref 0–10)
NEUTROPHILS # BLD: 14 K/UL (ref 1.8–7)
NEUTROPHILS NFR BLD AUTO: 85.9 % (ref 50–75)
NEUTROPHILS NFR BLD AUTO: 90 % (ref 42–75)
NRBC BLD AUTO-RTO: 0.2 % (ref 0–0)
OVALOCYTES BLD QL SMEAR: SLIGHT
PCO2 BLDV: 35 MMHG (ref 40–60)
PH BLDV: 7.44 [PH] (ref 7.32–7.43)
PH UR STRIP: 6 [PH] (ref 5–8)
PLATELET # BLD EST: NORMAL 10*3/UL
PLATELET # BLD: 322 K/UL (ref 130–400)
PMV BLD AUTO: 9.3 FL (ref 7.2–11.7)
PROT UR STRIP-MCNC: 30 MG/DL
RBC # BLD AUTO: 3.16 MIL/UL (ref 3.8–5.2)
RBC # UR STRIP: (no result) /UL
SP GR UR STRIP: 1.01 (ref 1–1.03)
SQUAMOUS EPITHIAL: 1 /HPF (ref 0–5)
TEARDROP CELLS: SLIGHT
TOTAL CELLS COUNTED BLD: 100
TROPONIN I SERPL-MCNC: 0.04 NG/ML (ref 0–0.12)
URINE CLARITY: (no result)
URINE NITRATE: NEGATIVE
UROBILINOGEN UR-MCNC: (no result) MG/DL (ref 0.2–1)
VENOUS BLOOD FIO2: 21 %
VENOUS BLOOD GAS PO2: 50 MM/HG (ref 30–55)
WBC # BLD AUTO: 16.3 K/UL (ref 4.8–10.8)

## 2017-08-10 RX ADMIN — ENOXAPARIN SODIUM SCH MG: 40 INJECTION SUBCUTANEOUS at 16:20

## 2017-08-10 RX ADMIN — DEXTROSE AND SODIUM CHLORIDE SCH MLS/HR: 5; 900 INJECTION, SOLUTION INTRAVENOUS at 12:30

## 2017-08-10 RX ADMIN — INSULIN LISPRO SCH: 100 INJECTION, SOLUTION INTRAVENOUS; SUBCUTANEOUS at 17:36

## 2017-08-10 RX ADMIN — INSULIN LISPRO SCH: 100 INJECTION, SOLUTION INTRAVENOUS; SUBCUTANEOUS at 22:15

## 2017-08-10 RX ADMIN — DEXTROSE AND SODIUM CHLORIDE SCH MLS/HR: 5; 900 INJECTION, SOLUTION INTRAVENOUS at 21:14

## 2017-08-11 LAB
% IRON SATURATION: 7 % (ref 20–55)
ALBUMIN SERPL-MCNC: 2.9 G/DL (ref 3.5–5)
ALBUMIN/GLOB SERPL: 0.7 {RATIO} (ref 1–2.1)
ALT SERPL-CCNC: 50 U/L (ref 9–52)
AST SERPL-CCNC: 59 U/L (ref 14–36)
BASOPHILS # BLD AUTO: 0.1 K/UL (ref 0–0.2)
BASOPHILS NFR BLD: 0.5 % (ref 0–2)
BUN SERPL-MCNC: 42 MG/DL (ref 7–17)
CALCIUM SERPL-MCNC: 8.7 MG/DL (ref 8.4–10.2)
EOSINOPHIL # BLD AUTO: 0.1 K/UL (ref 0–0.7)
EOSINOPHIL NFR BLD: 0.9 % (ref 0–4)
ERYTHROCYTE [DISTWIDTH] IN BLOOD BY AUTOMATED COUNT: 14.7 % (ref 11.5–14.5)
GFR NON-AFRICAN AMERICAN: 46
HDLC SERPL-MCNC: 13 MG/DL (ref 30–70)
HGB BLD-MCNC: 8.6 G/DL (ref 12–16)
IRON SERPL-MCNC: 16 UG/DL (ref 37–170)
LDLC SERPL-MCNC: 51 MG/DL (ref 0–129)
LYMPHOCYTES # BLD AUTO: 1.3 K/UL (ref 1–4.3)
LYMPHOCYTES NFR BLD AUTO: 11.4 % (ref 20–40)
MCH RBC QN AUTO: 29.1 PG (ref 27–31)
MCHC RBC AUTO-ENTMCNC: 32.4 G/DL (ref 33–37)
MCV RBC AUTO: 89.8 FL (ref 81–99)
MONOCYTES # BLD: 1 K/UL (ref 0–0.8)
MONOCYTES NFR BLD: 9.2 % (ref 0–10)
NEUTROPHILS # BLD: 8.8 K/UL (ref 1.8–7)
NEUTROPHILS NFR BLD AUTO: 78 % (ref 50–75)
NRBC BLD AUTO-RTO: 0 % (ref 0–0)
PLATELET # BLD: 251 K/UL (ref 130–400)
PMV BLD AUTO: 9.2 FL (ref 7.2–11.7)
RBC # BLD AUTO: 2.95 MIL/UL (ref 3.8–5.2)
TIBC SERPL-MCNC: 222 UG/DL (ref 250–450)
WBC # BLD AUTO: 11.3 K/UL (ref 4.8–10.8)

## 2017-08-11 RX ADMIN — Medication SCH APPLIC: at 11:22

## 2017-08-11 RX ADMIN — INSULIN LISPRO SCH: 100 INJECTION, SOLUTION INTRAVENOUS; SUBCUTANEOUS at 22:32

## 2017-08-11 RX ADMIN — INSULIN DETEMIR SCH UNITS: 100 INJECTION, SOLUTION SUBCUTANEOUS at 22:33

## 2017-08-11 RX ADMIN — INSULIN LISPRO SCH: 100 INJECTION, SOLUTION INTRAVENOUS; SUBCUTANEOUS at 11:23

## 2017-08-11 RX ADMIN — DEXTROSE AND SODIUM CHLORIDE SCH MLS/HR: 5; 900 INJECTION, SOLUTION INTRAVENOUS at 11:23

## 2017-08-11 RX ADMIN — INSULIN LISPRO SCH: 100 INJECTION, SOLUTION INTRAVENOUS; SUBCUTANEOUS at 17:19

## 2017-08-11 RX ADMIN — INSULIN LISPRO SCH: 100 INJECTION, SOLUTION INTRAVENOUS; SUBCUTANEOUS at 13:00

## 2017-08-11 RX ADMIN — ENOXAPARIN SODIUM SCH MG: 40 INJECTION SUBCUTANEOUS at 11:22

## 2017-08-11 RX ADMIN — INSULIN LISPRO SCH: 100 INJECTION, SOLUTION INTRAVENOUS; SUBCUTANEOUS at 15:40

## 2017-08-12 LAB
BUN SERPL-MCNC: 20 MG/DL (ref 7–17)
CALCIUM SERPL-MCNC: 8.8 MG/DL (ref 8.4–10.2)
ERYTHROCYTE [DISTWIDTH] IN BLOOD BY AUTOMATED COUNT: 15.1 % (ref 11.5–14.5)
FERRITIN SERPL-MCNC: 273 NG/ML
FOLATE SERPL-MCNC: 11.1 NG/ML
GFR NON-AFRICAN AMERICAN: > 60
HGB BLD-MCNC: 8.6 G/DL (ref 12–16)
MCH RBC QN AUTO: 29.1 PG (ref 27–31)
MCHC RBC AUTO-ENTMCNC: 33 G/DL (ref 33–37)
MCV RBC AUTO: 88.4 FL (ref 81–99)
PLATELET # BLD: 251 K/UL (ref 130–400)
RBC # BLD AUTO: 2.97 MIL/UL (ref 3.8–5.2)
VIT B12 SERPL-MCNC: 853 PG/ML (ref 239–931)
WBC # BLD AUTO: 10.6 K/UL (ref 4.8–10.8)

## 2017-08-12 RX ADMIN — INSULIN LISPRO SCH: 100 INJECTION, SOLUTION INTRAVENOUS; SUBCUTANEOUS at 21:18

## 2017-08-12 RX ADMIN — INSULIN LISPRO SCH UNIT: 100 INJECTION, SOLUTION INTRAVENOUS; SUBCUTANEOUS at 08:59

## 2017-08-12 RX ADMIN — INSULIN DETEMIR SCH UNITS: 100 INJECTION, SOLUTION SUBCUTANEOUS at 21:15

## 2017-08-12 RX ADMIN — Medication SCH APPLIC: at 09:05

## 2017-08-12 RX ADMIN — ENOXAPARIN SODIUM SCH MG: 40 INJECTION SUBCUTANEOUS at 08:59

## 2017-08-12 RX ADMIN — INSULIN LISPRO SCH UNIT: 100 INJECTION, SOLUTION INTRAVENOUS; SUBCUTANEOUS at 12:38

## 2017-08-12 RX ADMIN — LINEZOLID SCH MLS/HR: 600 INJECTION, SOLUTION INTRAVENOUS at 21:14

## 2017-08-12 RX ADMIN — INSULIN LISPRO SCH: 100 INJECTION, SOLUTION INTRAVENOUS; SUBCUTANEOUS at 16:59

## 2017-08-12 RX ADMIN — INSULIN LISPRO SCH UNIT: 100 INJECTION, SOLUTION INTRAVENOUS; SUBCUTANEOUS at 16:59

## 2017-08-12 RX ADMIN — INSULIN LISPRO SCH: 100 INJECTION, SOLUTION INTRAVENOUS; SUBCUTANEOUS at 12:38

## 2017-08-12 RX ADMIN — INSULIN LISPRO SCH: 100 INJECTION, SOLUTION INTRAVENOUS; SUBCUTANEOUS at 09:00

## 2017-08-13 RX ADMIN — INSULIN LISPRO SCH UNITS: 100 INJECTION, SOLUTION INTRAVENOUS; SUBCUTANEOUS at 16:06

## 2017-08-13 RX ADMIN — INSULIN LISPRO SCH: 100 INJECTION, SOLUTION INTRAVENOUS; SUBCUTANEOUS at 21:35

## 2017-08-13 RX ADMIN — INSULIN LISPRO SCH UNITS: 100 INJECTION, SOLUTION INTRAVENOUS; SUBCUTANEOUS at 16:07

## 2017-08-13 RX ADMIN — Medication SCH APPLIC: at 08:41

## 2017-08-13 RX ADMIN — INSULIN LISPRO SCH UNITS: 100 INJECTION, SOLUTION INTRAVENOUS; SUBCUTANEOUS at 11:33

## 2017-08-13 RX ADMIN — Medication SCH: at 12:00

## 2017-08-13 RX ADMIN — INSULIN LISPRO SCH: 100 INJECTION, SOLUTION INTRAVENOUS; SUBCUTANEOUS at 08:43

## 2017-08-13 RX ADMIN — INSULIN LISPRO SCH: 100 INJECTION, SOLUTION INTRAVENOUS; SUBCUTANEOUS at 11:32

## 2017-08-13 RX ADMIN — LINEZOLID SCH MLS/HR: 600 INJECTION, SOLUTION INTRAVENOUS at 21:38

## 2017-08-13 RX ADMIN — LINEZOLID SCH MLS/HR: 600 INJECTION, SOLUTION INTRAVENOUS at 08:41

## 2017-08-13 RX ADMIN — INSULIN LISPRO SCH UNIT: 100 INJECTION, SOLUTION INTRAVENOUS; SUBCUTANEOUS at 08:42

## 2017-08-13 RX ADMIN — ENOXAPARIN SODIUM SCH MG: 40 INJECTION SUBCUTANEOUS at 08:41

## 2017-08-14 RX ADMIN — INSULIN LISPRO SCH UNITS: 100 INJECTION, SOLUTION INTRAVENOUS; SUBCUTANEOUS at 17:08

## 2017-08-14 RX ADMIN — OXYCODONE HYDROCHLORIDE AND ACETAMINOPHEN PRN TAB: 5; 325 TABLET ORAL at 23:25

## 2017-08-14 RX ADMIN — LINEZOLID SCH MLS/HR: 600 INJECTION, SOLUTION INTRAVENOUS at 21:30

## 2017-08-14 RX ADMIN — Medication SCH: at 08:46

## 2017-08-14 RX ADMIN — OXYCODONE HYDROCHLORIDE AND ACETAMINOPHEN PRN TAB: 5; 325 TABLET ORAL at 17:14

## 2017-08-14 RX ADMIN — INSULIN LISPRO SCH UNITS: 100 INJECTION, SOLUTION INTRAVENOUS; SUBCUTANEOUS at 08:00

## 2017-08-14 RX ADMIN — INSULIN LISPRO SCH: 100 INJECTION, SOLUTION INTRAVENOUS; SUBCUTANEOUS at 21:33

## 2017-08-14 RX ADMIN — INSULIN LISPRO SCH UNITS: 100 INJECTION, SOLUTION INTRAVENOUS; SUBCUTANEOUS at 11:56

## 2017-08-14 RX ADMIN — INSULIN LISPRO SCH: 100 INJECTION, SOLUTION INTRAVENOUS; SUBCUTANEOUS at 17:08

## 2017-08-14 RX ADMIN — INSULIN LISPRO SCH: 100 INJECTION, SOLUTION INTRAVENOUS; SUBCUTANEOUS at 11:56

## 2017-08-14 RX ADMIN — INSULIN LISPRO SCH: 100 INJECTION, SOLUTION INTRAVENOUS; SUBCUTANEOUS at 06:50

## 2017-08-14 RX ADMIN — LINEZOLID SCH MLS/HR: 600 INJECTION, SOLUTION INTRAVENOUS at 08:46

## 2017-08-15 VITALS
TEMPERATURE: 97.9 F | DIASTOLIC BLOOD PRESSURE: 77 MMHG | HEART RATE: 75 BPM | SYSTOLIC BLOOD PRESSURE: 144 MMHG | OXYGEN SATURATION: 100 %

## 2017-08-15 VITALS — RESPIRATION RATE: 18 BRPM

## 2017-08-15 LAB
ALBUMIN SERPL-MCNC: 3.1 G/DL (ref 3.5–5)
ALBUMIN/GLOB SERPL: 0.8 {RATIO} (ref 1–2.1)
ALT SERPL-CCNC: 34 U/L (ref 9–52)
AST SERPL-CCNC: 23 U/L (ref 14–36)
BASOPHILS # BLD AUTO: 0 K/UL (ref 0–0.2)
BASOPHILS NFR BLD: 0.3 % (ref 0–2)
BUN SERPL-MCNC: 16 MG/DL (ref 7–17)
CALCIUM SERPL-MCNC: 9 MG/DL (ref 8.4–10.2)
EOSINOPHIL # BLD AUTO: 0.1 K/UL (ref 0–0.7)
EOSINOPHIL NFR BLD: 1.1 % (ref 0–4)
ERYTHROCYTE [DISTWIDTH] IN BLOOD BY AUTOMATED COUNT: 15.2 % (ref 11.5–14.5)
GFR NON-AFRICAN AMERICAN: 57
HGB BLD-MCNC: 9 G/DL (ref 12–16)
LYMPHOCYTES # BLD AUTO: 1.2 K/UL (ref 1–4.3)
LYMPHOCYTES NFR BLD AUTO: 10.9 % (ref 20–40)
MCH RBC QN AUTO: 29.2 PG (ref 27–31)
MCHC RBC AUTO-ENTMCNC: 33 G/DL (ref 33–37)
MCV RBC AUTO: 88.5 FL (ref 81–99)
MONOCYTES # BLD: 1 K/UL (ref 0–0.8)
MONOCYTES NFR BLD: 9.1 % (ref 0–10)
NEUTROPHILS # BLD: 8.9 K/UL (ref 1.8–7)
NEUTROPHILS NFR BLD AUTO: 78.6 % (ref 50–75)
NRBC BLD AUTO-RTO: 0.1 % (ref 0–0)
PLATELET # BLD: 278 K/UL (ref 130–400)
PMV BLD AUTO: 8.5 FL (ref 7.2–11.7)
RBC # BLD AUTO: 3.09 MIL/UL (ref 3.8–5.2)
WBC # BLD AUTO: 11.3 K/UL (ref 4.8–10.8)

## 2017-08-15 RX ADMIN — INSULIN LISPRO SCH UNITS: 100 INJECTION, SOLUTION INTRAVENOUS; SUBCUTANEOUS at 08:33

## 2017-08-15 RX ADMIN — OXYCODONE HYDROCHLORIDE AND ACETAMINOPHEN PRN TAB: 5; 325 TABLET ORAL at 11:46

## 2017-08-15 RX ADMIN — INSULIN LISPRO SCH: 100 INJECTION, SOLUTION INTRAVENOUS; SUBCUTANEOUS at 06:30

## 2017-08-15 RX ADMIN — Medication SCH: at 11:00

## 2017-08-15 RX ADMIN — INSULIN LISPRO SCH UNITS: 100 INJECTION, SOLUTION INTRAVENOUS; SUBCUTANEOUS at 13:00

## 2017-08-15 RX ADMIN — LINEZOLID SCH MLS/HR: 600 INJECTION, SOLUTION INTRAVENOUS at 08:18

## 2017-08-24 ENCOUNTER — HOSPITAL ENCOUNTER (INPATIENT)
Dept: HOSPITAL 14 - H.ER | Age: 57
LOS: 15 days | Discharge: NURSING, REHAB OR LONG-TERM CARE | DRG: 239 | End: 2017-09-08
Attending: FAMILY MEDICINE | Admitting: FAMILY MEDICINE
Payer: MEDICARE

## 2017-08-24 VITALS — BODY MASS INDEX: 33.7 KG/M2

## 2017-08-24 DIAGNOSIS — E66.01: ICD-10-CM

## 2017-08-24 DIAGNOSIS — L97.519: ICD-10-CM

## 2017-08-24 DIAGNOSIS — E11.69: ICD-10-CM

## 2017-08-24 DIAGNOSIS — Z88.1: ICD-10-CM

## 2017-08-24 DIAGNOSIS — Z79.4: ICD-10-CM

## 2017-08-24 DIAGNOSIS — E11.649: ICD-10-CM

## 2017-08-24 DIAGNOSIS — E87.6: ICD-10-CM

## 2017-08-24 DIAGNOSIS — E11.65: ICD-10-CM

## 2017-08-24 DIAGNOSIS — M86.171: ICD-10-CM

## 2017-08-24 DIAGNOSIS — F17.210: ICD-10-CM

## 2017-08-24 DIAGNOSIS — D63.8: ICD-10-CM

## 2017-08-24 DIAGNOSIS — Z79.82: ICD-10-CM

## 2017-08-24 DIAGNOSIS — K21.9: ICD-10-CM

## 2017-08-24 DIAGNOSIS — E78.5: ICD-10-CM

## 2017-08-24 DIAGNOSIS — Z79.02: ICD-10-CM

## 2017-08-24 DIAGNOSIS — F32.9: ICD-10-CM

## 2017-08-24 DIAGNOSIS — F41.9: ICD-10-CM

## 2017-08-24 DIAGNOSIS — J45.909: ICD-10-CM

## 2017-08-24 DIAGNOSIS — E11.621: ICD-10-CM

## 2017-08-24 DIAGNOSIS — Z91.19: ICD-10-CM

## 2017-08-24 DIAGNOSIS — D50.9: ICD-10-CM

## 2017-08-24 DIAGNOSIS — I10: ICD-10-CM

## 2017-08-24 DIAGNOSIS — A48.0: ICD-10-CM

## 2017-08-24 DIAGNOSIS — G89.29: ICD-10-CM

## 2017-08-24 DIAGNOSIS — E11.52: Primary | ICD-10-CM

## 2017-08-24 LAB
ALBUMIN/GLOB SERPL: 0.8 {RATIO} (ref 1–2.1)
ALP SERPL-CCNC: 425 U/L (ref 38–126)
ALT SERPL-CCNC: 36 U/L (ref 9–52)
APTT BLD: 32.6 SECONDS (ref 25.6–37.1)
AST SERPL-CCNC: 38 U/L (ref 14–36)
BASE EXCESS BLDV CALC-SCNC: -0.5 MMOL/L (ref 0–2)
BASOPHILS # BLD AUTO: 0.1 K/UL (ref 0–0.2)
BASOPHILS NFR BLD: 0.7 % (ref 0–2)
BILIRUB SERPL-MCNC: 0.8 MG/DL (ref 0.2–1.3)
BILIRUB UR-MCNC: NEGATIVE MG/DL
BUN SERPL-MCNC: 55 MG/DL (ref 7–17)
CALCIUM SERPL-MCNC: 9.7 MG/DL (ref 8.4–10.2)
CHLORIDE SERPL-SCNC: 98 MMOL/L (ref 98–107)
CO2 SERPL-SCNC: 21 MMOL/L (ref 22–30)
COLOR UR: YELLOW
EOSINOPHIL # BLD AUTO: 0.1 K/UL (ref 0–0.7)
EOSINOPHIL NFR BLD: 0.5 % (ref 0–4)
ERYTHROCYTE [DISTWIDTH] IN BLOOD BY AUTOMATED COUNT: 16 % (ref 11.5–14.5)
GLOBULIN SER-MCNC: 4.6 GM/DL (ref 2.2–3.9)
GLUCOSE SERPL-MCNC: 99 MG/DL (ref 65–105)
GLUCOSE UR STRIP-MCNC: >=500 MG/DL
HCT VFR BLD CALC: 25.8 % (ref 34–47)
KETONES UR STRIP-MCNC: NEGATIVE MG/DL
LEUKOCYTE ESTERASE UR-ACNC: (no result) LEU/UL
LG PLATELETS BLD QL SMEAR: PRESENT
LYMPHOCYTES # BLD AUTO: 1.5 K/UL (ref 1–4.3)
LYMPHOCYTES NFR BLD AUTO: 8.5 % (ref 20–40)
MCH RBC QN AUTO: 28.6 PG (ref 27–31)
MCHC RBC AUTO-ENTMCNC: 33 G/DL (ref 33–37)
MCV RBC AUTO: 86.5 FL (ref 81–99)
MONOCYTES # BLD: 1.8 K/UL (ref 0–0.8)
MONOCYTES NFR BLD: 10.2 % (ref 0–10)
NEUTROPHILS # BLD: 14 K/UL (ref 1.8–7)
NEUTROPHILS NFR BLD AUTO: 80.1 % (ref 50–75)
NEUTROPHILS NFR BLD AUTO: 82 % (ref 42–75)
NRBC BLD AUTO-RTO: 0 % (ref 0–0)
PCO2 BLDV: 39 MMHG (ref 40–60)
PH BLDV: 7.4 [PH] (ref 7.32–7.43)
PH UR STRIP: 6 [PH] (ref 5–8)
PLATELET # BLD: 339 K/UL (ref 130–400)
PMV BLD AUTO: 9 FL (ref 7.2–11.7)
POTASSIUM SERPL-SCNC: 4 MMOL/L (ref 3.6–5)
PROT SERPL-MCNC: 8.1 G/DL (ref 6.3–8.2)
PROT UR STRIP-MCNC: 30 MG/DL
RBC # UR STRIP: (no result) /UL
RBC #/AREA URNS HPF: 5 /HPF (ref 0–3)
SODIUM SERPL-SCNC: 130 MMOL/L (ref 132–148)
SP GR UR STRIP: 1.01 (ref 1–1.03)
TOTAL CELLS COUNTED BLD: 100
UROBILINOGEN UR-MCNC: (no result) MG/DL (ref 0.2–1)
WBC # BLD AUTO: 17.4 K/UL (ref 4.8–10.8)
WBC #/AREA URNS HPF: 2 /HPF (ref 0–5)

## 2017-08-24 PROCEDURE — 0JBQ0ZZ EXCISION OF RIGHT FOOT SUBCUTANEOUS TISSUE AND FASCIA, OPEN APPROACH: ICD-10-PCS

## 2017-08-24 PROCEDURE — 0J9Q0ZX DRAINAGE OF RIGHT FOOT SUBCUTANEOUS TISSUE AND FASCIA, OPEN APPROACH, DIAGNOSTIC: ICD-10-PCS

## 2017-08-24 RX ADMIN — HYDROMORPHONE HYDROCHLORIDE PRN MG: 1 INJECTION, SOLUTION INTRAMUSCULAR; INTRAVENOUS; SUBCUTANEOUS at 23:34

## 2017-08-24 RX ADMIN — HYDROMORPHONE HYDROCHLORIDE PRN MG: 1 INJECTION, SOLUTION INTRAMUSCULAR; INTRAVENOUS; SUBCUTANEOUS at 22:58

## 2017-08-24 RX ADMIN — HYDROMORPHONE HYDROCHLORIDE PRN MG: 1 INJECTION, SOLUTION INTRAMUSCULAR; INTRAVENOUS; SUBCUTANEOUS at 22:45

## 2017-08-24 RX ADMIN — HYDROMORPHONE HYDROCHLORIDE PRN MG: 1 INJECTION, SOLUTION INTRAMUSCULAR; INTRAVENOUS; SUBCUTANEOUS at 23:19

## 2017-08-24 RX ADMIN — CLINDAMYCIN PHOSPHATE SCH: 150 INJECTION, SOLUTION INTRAVENOUS at 22:08

## 2017-08-25 LAB
BUN SERPL-MCNC: 37 MG/DL (ref 7–17)
CALCIUM SERPL-MCNC: 8.5 MG/DL (ref 8.4–10.2)
CHLORIDE SERPL-SCNC: 103 MMOL/L (ref 98–107)
CO2 SERPL-SCNC: 23 MMOL/L (ref 22–30)
ERYTHROCYTE [DISTWIDTH] IN BLOOD BY AUTOMATED COUNT: 15.9 % (ref 11.5–14.5)
ERYTHROCYTE [DISTWIDTH] IN BLOOD BY AUTOMATED COUNT: 16.1 % (ref 11.5–14.5)
GLUCOSE SERPL-MCNC: 75 MG/DL (ref 65–105)
HCT VFR BLD CALC: 21.4 % (ref 34–47)
HCT VFR BLD CALC: 22.3 % (ref 34–47)
MCH RBC QN AUTO: 29 PG (ref 27–31)
MCH RBC QN AUTO: 29.1 PG (ref 27–31)
MCHC RBC AUTO-ENTMCNC: 33.2 G/DL (ref 33–37)
MCHC RBC AUTO-ENTMCNC: 33.4 G/DL (ref 33–37)
MCV RBC AUTO: 87.2 FL (ref 81–99)
MCV RBC AUTO: 87.4 FL (ref 81–99)
PLATELET # BLD: 243 K/UL (ref 130–400)
PLATELET # BLD: 252 K/UL (ref 130–400)
POTASSIUM SERPL-SCNC: 3.4 MMOL/L (ref 3.6–5)
SODIUM SERPL-SCNC: 133 MMOL/L (ref 132–148)
WBC # BLD AUTO: 10.2 K/UL (ref 4.8–10.8)
WBC # BLD AUTO: 9.8 K/UL (ref 4.8–10.8)

## 2017-08-25 PROCEDURE — 30233N1 TRANSFUSION OF NONAUTOLOGOUS RED BLOOD CELLS INTO PERIPHERAL VEIN, PERCUTANEOUS APPROACH: ICD-10-PCS | Performed by: FAMILY MEDICINE

## 2017-08-25 RX ADMIN — OXYCODONE HYDROCHLORIDE AND ACETAMINOPHEN PRN TAB: 5; 325 TABLET ORAL at 12:27

## 2017-08-25 RX ADMIN — CLINDAMYCIN PHOSPHATE SCH MLS/HR: 150 INJECTION, SOLUTION INTRAVENOUS at 21:17

## 2017-08-25 RX ADMIN — INSULIN LISPRO SCH: 100 INJECTION, SOLUTION INTRAVENOUS; SUBCUTANEOUS at 21:21

## 2017-08-25 RX ADMIN — OXYCODONE HYDROCHLORIDE AND ACETAMINOPHEN PRN TAB: 5; 325 TABLET ORAL at 20:27

## 2017-08-25 RX ADMIN — INSULIN LISPRO SCH: 100 INJECTION, SOLUTION INTRAVENOUS; SUBCUTANEOUS at 11:30

## 2017-08-25 RX ADMIN — OXYCODONE HYDROCHLORIDE AND ACETAMINOPHEN PRN TAB: 5; 325 TABLET ORAL at 08:36

## 2017-08-25 RX ADMIN — INSULIN LISPRO SCH U: 100 INJECTION, SOLUTION INTRAVENOUS; SUBCUTANEOUS at 17:29

## 2017-08-25 RX ADMIN — CLINDAMYCIN PHOSPHATE SCH MLS/HR: 150 INJECTION, SOLUTION INTRAVENOUS at 08:27

## 2017-08-26 LAB
ERYTHROCYTE [DISTWIDTH] IN BLOOD BY AUTOMATED COUNT: 17.1 % (ref 11.5–14.5)
HCT VFR BLD CALC: 23.9 % (ref 34–47)
MCH RBC QN AUTO: 28.6 PG (ref 27–31)
MCHC RBC AUTO-ENTMCNC: 33.1 G/DL (ref 33–37)
MCV RBC AUTO: 86.3 FL (ref 81–99)
PLATELET # BLD: 222 K/UL (ref 130–400)
WBC # BLD AUTO: 8.2 K/UL (ref 4.8–10.8)

## 2017-08-26 RX ADMIN — INSULIN LISPRO SCH U: 100 INJECTION, SOLUTION INTRAVENOUS; SUBCUTANEOUS at 12:22

## 2017-08-26 RX ADMIN — CLINDAMYCIN PHOSPHATE SCH MLS/HR: 150 INJECTION, SOLUTION INTRAVENOUS at 21:20

## 2017-08-26 RX ADMIN — CLINDAMYCIN PHOSPHATE SCH MLS/HR: 150 INJECTION, SOLUTION INTRAVENOUS at 10:09

## 2017-08-26 RX ADMIN — INSULIN LISPRO SCH U: 100 INJECTION, SOLUTION INTRAVENOUS; SUBCUTANEOUS at 17:16

## 2017-08-26 RX ADMIN — INSULIN LISPRO SCH: 100 INJECTION, SOLUTION INTRAVENOUS; SUBCUTANEOUS at 21:20

## 2017-08-26 RX ADMIN — INSULIN LISPRO SCH U: 100 INJECTION, SOLUTION INTRAVENOUS; SUBCUTANEOUS at 07:06

## 2017-08-27 LAB
ALBUMIN/GLOB SERPL: 0.7 {RATIO} (ref 1–2.1)
ALP SERPL-CCNC: 376 U/L (ref 38–126)
ALT SERPL-CCNC: 20 U/L (ref 9–52)
AST SERPL-CCNC: 23 U/L (ref 14–36)
BASOPHILS # BLD AUTO: 0.1 K/UL (ref 0–0.2)
BASOPHILS NFR BLD: 1 % (ref 0–2)
BILIRUB SERPL-MCNC: 0.6 MG/DL (ref 0.2–1.3)
BUN SERPL-MCNC: 16 MG/DL (ref 7–17)
CALCIUM SERPL-MCNC: 8.4 MG/DL (ref 8.4–10.2)
CHLORIDE SERPL-SCNC: 103 MMOL/L (ref 98–107)
CO2 SERPL-SCNC: 25 MMOL/L (ref 22–30)
EOSINOPHIL # BLD AUTO: 0.1 K/UL (ref 0–0.7)
EOSINOPHIL NFR BLD: 1 % (ref 0–4)
ERYTHROCYTE [DISTWIDTH] IN BLOOD BY AUTOMATED COUNT: 16.2 % (ref 11.5–14.5)
GLOBULIN SER-MCNC: 3.9 GM/DL (ref 2.2–3.9)
GLUCOSE SERPL-MCNC: 184 MG/DL (ref 65–105)
HCT VFR BLD CALC: 28.5 % (ref 34–47)
LYMPHOCYTES # BLD AUTO: 1.9 K/UL (ref 1–4.3)
LYMPHOCYTES NFR BLD AUTO: 17.2 % (ref 20–40)
MCH RBC QN AUTO: 28.5 PG (ref 27–31)
MCHC RBC AUTO-ENTMCNC: 32.8 G/DL (ref 33–37)
MCV RBC AUTO: 86.8 FL (ref 81–99)
MONOCYTES # BLD: 1 K/UL (ref 0–0.8)
MONOCYTES NFR BLD: 9.6 % (ref 0–10)
NEUTROPHILS # BLD: 7.7 K/UL (ref 1.8–7)
NEUTROPHILS NFR BLD AUTO: 71.2 % (ref 50–75)
NRBC BLD AUTO-RTO: 0 % (ref 0–0)
PLATELET # BLD: 244 K/UL (ref 130–400)
PMV BLD AUTO: 9 FL (ref 7.2–11.7)
POTASSIUM SERPL-SCNC: 4.8 MMOL/L (ref 3.6–5)
PROT SERPL-MCNC: 6.6 G/DL (ref 6.3–8.2)
SODIUM SERPL-SCNC: 133 MMOL/L (ref 132–148)
WBC # BLD AUTO: 10.9 K/UL (ref 4.8–10.8)

## 2017-08-27 RX ADMIN — CLINDAMYCIN PHOSPHATE SCH MLS/HR: 150 INJECTION, SOLUTION INTRAVENOUS at 08:13

## 2017-08-27 RX ADMIN — INSULIN LISPRO SCH: 100 INJECTION, SOLUTION INTRAVENOUS; SUBCUTANEOUS at 21:05

## 2017-08-27 RX ADMIN — INSULIN LISPRO SCH U: 100 INJECTION, SOLUTION INTRAVENOUS; SUBCUTANEOUS at 17:57

## 2017-08-27 RX ADMIN — INSULIN LISPRO SCH U: 100 INJECTION, SOLUTION INTRAVENOUS; SUBCUTANEOUS at 08:27

## 2017-08-27 RX ADMIN — INSULIN LISPRO SCH U: 100 INJECTION, SOLUTION INTRAVENOUS; SUBCUTANEOUS at 12:34

## 2017-08-27 RX ADMIN — CLINDAMYCIN PHOSPHATE SCH MLS/HR: 150 INJECTION, SOLUTION INTRAVENOUS at 21:06

## 2017-08-28 LAB
APTT BLD: 35 SECONDS (ref 25.6–37.1)
BUN SERPL-MCNC: 16 MG/DL (ref 7–17)
CALCIUM SERPL-MCNC: 8.8 MG/DL (ref 8.4–10.2)
CHLORIDE SERPL-SCNC: 98 MMOL/L (ref 98–107)
CO2 SERPL-SCNC: 25 MMOL/L (ref 22–30)
ERYTHROCYTE [DISTWIDTH] IN BLOOD BY AUTOMATED COUNT: 16.3 % (ref 11.5–14.5)
GLUCOSE SERPL-MCNC: 330 MG/DL (ref 65–105)
HCT VFR BLD CALC: 29.6 % (ref 34–47)
MCH RBC QN AUTO: 28.4 PG (ref 27–31)
MCHC RBC AUTO-ENTMCNC: 32.5 G/DL (ref 33–37)
MCV RBC AUTO: 87.4 FL (ref 81–99)
PLATELET # BLD: 300 K/UL (ref 130–400)
POTASSIUM SERPL-SCNC: 5.2 MMOL/L (ref 3.6–5)
SODIUM SERPL-SCNC: 132 MMOL/L (ref 132–148)
WBC # BLD AUTO: 11.4 K/UL (ref 4.8–10.8)

## 2017-08-28 RX ADMIN — CLINDAMYCIN PHOSPHATE SCH MLS/HR: 150 INJECTION, SOLUTION INTRAVENOUS at 22:24

## 2017-08-28 RX ADMIN — INSULIN LISPRO SCH U: 100 INJECTION, SOLUTION INTRAVENOUS; SUBCUTANEOUS at 12:18

## 2017-08-28 RX ADMIN — CLINDAMYCIN PHOSPHATE SCH MLS/HR: 150 INJECTION, SOLUTION INTRAVENOUS at 08:28

## 2017-08-28 RX ADMIN — INSULIN LISPRO SCH U: 100 INJECTION, SOLUTION INTRAVENOUS; SUBCUTANEOUS at 21:45

## 2017-08-28 RX ADMIN — INSULIN LISPRO SCH U: 100 INJECTION, SOLUTION INTRAVENOUS; SUBCUTANEOUS at 16:52

## 2017-08-28 RX ADMIN — INSULIN LISPRO SCH U: 100 INJECTION, SOLUTION INTRAVENOUS; SUBCUTANEOUS at 08:29

## 2017-08-29 ENCOUNTER — HOSPITAL ENCOUNTER (OUTPATIENT)
Dept: HOSPITAL 31 - C.CATHLAB | Age: 57
Discharge: HOME | End: 2017-08-29
Attending: INTERNAL MEDICINE
Payer: MEDICARE

## 2017-08-29 VITALS — BODY MASS INDEX: 36.5 KG/M2

## 2017-08-29 VITALS
OXYGEN SATURATION: 100 % | SYSTOLIC BLOOD PRESSURE: 108 MMHG | TEMPERATURE: 97.8 F | DIASTOLIC BLOOD PRESSURE: 54 MMHG | HEART RATE: 70 BPM | RESPIRATION RATE: 18 BRPM

## 2017-08-29 DIAGNOSIS — I73.9: Primary | ICD-10-CM

## 2017-08-29 LAB
BUN SERPL-MCNC: 15 MG/DL (ref 7–17)
CALCIUM SERPL-MCNC: 8.5 MG/DL (ref 8.4–10.2)
CHLORIDE SERPL-SCNC: 100 MMOL/L (ref 98–107)
CO2 SERPL-SCNC: 26 MMOL/L (ref 22–30)
GLUCOSE SERPL-MCNC: 286 MG/DL (ref 65–105)
POTASSIUM SERPL-SCNC: 4.5 MMOL/L (ref 3.6–5)
SODIUM SERPL-SCNC: 131 MMOL/L (ref 132–148)

## 2017-08-29 PROCEDURE — B400YZZ PLAIN RADIOGRAPHY OF ABDOMINAL AORTA USING OTHER CONTRAST: ICD-10-PCS

## 2017-08-29 PROCEDURE — B40FYZZ PLAIN RADIOGRAPHY OF RIGHT LOWER EXTREMITY ARTERIES USING OTHER CONTRAST: ICD-10-PCS

## 2017-08-29 PROCEDURE — B40GYZZ PLAIN RADIOGRAPHY OF LEFT LOWER EXTREMITY ARTERIES USING OTHER CONTRAST: ICD-10-PCS

## 2017-08-29 RX ADMIN — INSULIN LISPRO SCH U: 100 INJECTION, SOLUTION INTRAVENOUS; SUBCUTANEOUS at 11:24

## 2017-08-29 RX ADMIN — INSULIN LISPRO SCH U: 100 INJECTION, SOLUTION INTRAVENOUS; SUBCUTANEOUS at 07:05

## 2017-08-29 RX ADMIN — CLINDAMYCIN PHOSPHATE SCH MLS/HR: 150 INJECTION, SOLUTION INTRAVENOUS at 09:27

## 2017-08-29 RX ADMIN — CLINDAMYCIN PHOSPHATE SCH MLS/HR: 150 INJECTION, SOLUTION INTRAVENOUS at 21:51

## 2017-08-29 NOTE — CP.SDSHP
[facility]





- History


Proposed Procedure: please see yun consultation.  no change





- Allergies


Allergies: 


Allergies





vancomycin Adverse Reaction (Verified 11/03/16 13:09)


 ITCHING











Short Stay Discharge





- Short Stay Discharge


Admitting Diagnosis/Reason for Visit: PVD


Disposition: HOME/ ROUTINE

## 2017-08-30 LAB
ALBUMIN/GLOB SERPL: 0.8 {RATIO} (ref 1–2.1)
ALP SERPL-CCNC: 367 U/L (ref 38–126)
ALT SERPL-CCNC: 28 U/L (ref 9–52)
AST SERPL-CCNC: 20 U/L (ref 14–36)
BASOPHILS # BLD AUTO: 0.1 K/UL (ref 0–0.2)
BASOPHILS NFR BLD: 0.8 % (ref 0–2)
BILIRUB SERPL-MCNC: 0.5 MG/DL (ref 0.2–1.3)
BUN SERPL-MCNC: 16 MG/DL (ref 7–17)
CALCIUM SERPL-MCNC: 8.4 MG/DL (ref 8.4–10.2)
CHLORIDE SERPL-SCNC: 96 MMOL/L (ref 98–107)
CO2 SERPL-SCNC: 27 MMOL/L (ref 22–30)
EOSINOPHIL # BLD AUTO: 0.1 K/UL (ref 0–0.7)
EOSINOPHIL NFR BLD: 1.1 % (ref 0–4)
ERYTHROCYTE [DISTWIDTH] IN BLOOD BY AUTOMATED COUNT: 16.5 % (ref 11.5–14.5)
GLOBULIN SER-MCNC: 3.9 GM/DL (ref 2.2–3.9)
GLUCOSE SERPL-MCNC: 410 MG/DL (ref 65–105)
HCT VFR BLD CALC: 29.2 % (ref 34–47)
LYMPHOCYTES # BLD AUTO: 1.4 K/UL (ref 1–4.3)
LYMPHOCYTES NFR BLD AUTO: 13.1 % (ref 20–40)
MCH RBC QN AUTO: 28 PG (ref 27–31)
MCHC RBC AUTO-ENTMCNC: 31.8 G/DL (ref 33–37)
MCV RBC AUTO: 88.2 FL (ref 81–99)
MONOCYTES # BLD: 0.8 K/UL (ref 0–0.8)
MONOCYTES NFR BLD: 7.4 % (ref 0–10)
NEUTROPHILS # BLD: 8.5 K/UL (ref 1.8–7)
NEUTROPHILS NFR BLD AUTO: 77.6 % (ref 50–75)
NRBC BLD AUTO-RTO: 0.1 % (ref 0–0)
PLATELET # BLD: 319 K/UL (ref 130–400)
PMV BLD AUTO: 8.6 FL (ref 7.2–11.7)
POTASSIUM SERPL-SCNC: 5 MMOL/L (ref 3.6–5)
PROT SERPL-MCNC: 6.9 G/DL (ref 6.3–8.2)
SODIUM SERPL-SCNC: 130 MMOL/L (ref 132–148)
WBC # BLD AUTO: 10.9 K/UL (ref 4.8–10.8)

## 2017-08-30 RX ADMIN — CLINDAMYCIN PHOSPHATE SCH MLS/HR: 150 INJECTION, SOLUTION INTRAVENOUS at 09:16

## 2017-08-30 RX ADMIN — CLINDAMYCIN PHOSPHATE SCH MLS/HR: 150 INJECTION, SOLUTION INTRAVENOUS at 20:30

## 2017-08-31 RX ADMIN — CLINDAMYCIN PHOSPHATE SCH MLS/HR: 150 INJECTION, SOLUTION INTRAVENOUS at 20:30

## 2017-08-31 RX ADMIN — CLINDAMYCIN PHOSPHATE SCH MLS/HR: 150 INJECTION, SOLUTION INTRAVENOUS at 09:52

## 2017-08-31 NOTE — OP
PROCEDURE DATE:  08/29/2017



SURGEON:  Dr. Paz Ames.



PREOPERATIVE DIAGNOSIS:  Peripheral vascular with gangrene of the right

foot.



POSTOPERATIVE DIAGNOSIS: Peripheral vascular with gangrene of the right

foot.



PROCEDURE PERFORMED:  Retrograde access, left common femoral artery,

selective catheter placement in the infrarenal abdominal aorta.  Abdominal

aortography with bilateral iliofemoral runoff, bilateral lower extremity

angiography.



COMPLICATIONS:  None.



HISTORY:  This patient is a 56-year-old female with past medical history of

hypertension, hyperlipidemia, diabetes mellitus, who presented with gas

gangrene of the right foot.  The patient is referred for peripheral

angiography given abnormal arterial duplex suggestive of significant

peripheral vascular disease.



DESCRIPTION OF PROCEDURE:  After obtaining the informed consent, the

patient is prepped and draped in usual sterile fashion.   The left groin

was anesthetized with 2% lidocaine solution.  A 5-Australian sheath was

inserted into the left common femoral artery.  An Omniflush catheter was

advanced into the infrarenal abdominal aorta.  Abdominal aortography and

bilateral iliofemoral runoff and bilateral lower extremity angiography was

then performed.  There were no complications.



FINDINGS:  The infrarenal abdominal aorta is free of aneurysm or

dissection.  There is no significant renal artery stenosis.  Central plaque

is noted in the proximal right common iliac artery, but there is no

significant stenosis.  There is no significant inflow stenosis bilaterally.

Mild eccentric calcification is noted.  The right superficial femoral

artery has a 30% stenosis in the mid portion.  Concentrating the right leg,

there is mild disease of the right mild popliteal artery.  3-vessel runoff

is noted to the right foot.  There is no significant tibioperoneal disease.

The patient tolerated the procedure well without complications.  All

catheters removed and manual pressure was applied to achieve hemostasis.



CONCLUSION:  No significant peripheral vascular disease with 3-vessel

runoff to the foot.





__________________________________________

Paz Ames MD





DD:  08/31/2017 11:39:42

DT:  08/31/2017 13:35:51

Job # [id number]

## 2017-09-01 RX ADMIN — CLINDAMYCIN PHOSPHATE SCH MLS/HR: 150 INJECTION, SOLUTION INTRAVENOUS at 08:49

## 2017-09-01 RX ADMIN — INSULIN LISPRO SCH UNITS: 100 INJECTION, SUSPENSION SUBCUTANEOUS at 17:13

## 2017-09-01 RX ADMIN — CLINDAMYCIN PHOSPHATE SCH MLS/HR: 150 INJECTION, SOLUTION INTRAVENOUS at 20:30

## 2017-09-02 LAB
ALBUMIN/GLOB SERPL: 0.7 {RATIO} (ref 1–2.1)
ALP SERPL-CCNC: 315 U/L (ref 38–126)
ALT SERPL-CCNC: 27 U/L (ref 9–52)
AST SERPL-CCNC: 20 U/L (ref 14–36)
BILIRUB SERPL-MCNC: 0.4 MG/DL (ref 0.2–1.3)
BUN SERPL-MCNC: 18 MG/DL (ref 7–17)
CALCIUM SERPL-MCNC: 8.8 MG/DL (ref 8.4–10.2)
CHLORIDE SERPL-SCNC: 99 MMOL/L (ref 98–107)
CO2 SERPL-SCNC: 26 MMOL/L (ref 22–30)
ERYTHROCYTE [DISTWIDTH] IN BLOOD BY AUTOMATED COUNT: 16.8 % (ref 11.5–14.5)
GLOBULIN SER-MCNC: 3.9 GM/DL (ref 2.2–3.9)
GLUCOSE SERPL-MCNC: 214 MG/DL (ref 65–105)
HCT VFR BLD CALC: 28.7 % (ref 34–47)
MCH RBC QN AUTO: 29.1 PG (ref 27–31)
MCHC RBC AUTO-ENTMCNC: 33.3 G/DL (ref 33–37)
MCV RBC AUTO: 87.5 FL (ref 81–99)
PLATELET # BLD: 360 K/UL (ref 130–400)
POTASSIUM SERPL-SCNC: 4.6 MMOL/L (ref 3.6–5)
PROT SERPL-MCNC: 6.8 G/DL (ref 6.3–8.2)
SODIUM SERPL-SCNC: 133 MMOL/L (ref 132–148)
WBC # BLD AUTO: 10.1 K/UL (ref 4.8–10.8)

## 2017-09-02 RX ADMIN — INSULIN LISPRO SCH UNITS: 100 INJECTION, SUSPENSION SUBCUTANEOUS at 17:45

## 2017-09-02 RX ADMIN — CLINDAMYCIN PHOSPHATE SCH MLS/HR: 150 INJECTION, SOLUTION INTRAVENOUS at 08:41

## 2017-09-02 RX ADMIN — CLINDAMYCIN PHOSPHATE SCH MLS/HR: 150 INJECTION, SOLUTION INTRAVENOUS at 20:10

## 2017-09-02 RX ADMIN — INSULIN LISPRO SCH UNITS: 100 INJECTION, SUSPENSION SUBCUTANEOUS at 08:43

## 2017-09-03 RX ADMIN — INSULIN LISPRO SCH UNITS: 100 INJECTION, SUSPENSION SUBCUTANEOUS at 09:05

## 2017-09-03 RX ADMIN — INSULIN LISPRO SCH UNITS: 100 INJECTION, SUSPENSION SUBCUTANEOUS at 17:17

## 2017-09-03 RX ADMIN — CLINDAMYCIN PHOSPHATE SCH MLS/HR: 150 INJECTION, SOLUTION INTRAVENOUS at 20:30

## 2017-09-03 RX ADMIN — CLINDAMYCIN PHOSPHATE SCH MLS/HR: 150 INJECTION, SOLUTION INTRAVENOUS at 08:57

## 2017-09-04 RX ADMIN — INSULIN LISPRO SCH UNITS: 100 INJECTION, SUSPENSION SUBCUTANEOUS at 08:44

## 2017-09-04 RX ADMIN — Medication SCH CAP: at 16:35

## 2017-09-04 RX ADMIN — CLINDAMYCIN PHOSPHATE SCH MLS/HR: 150 INJECTION, SOLUTION INTRAVENOUS at 08:43

## 2017-09-04 RX ADMIN — INSULIN LISPRO SCH UNITS: 100 INJECTION, SUSPENSION SUBCUTANEOUS at 17:30

## 2017-09-05 LAB
ALBUMIN/GLOB SERPL: 0.8 {RATIO} (ref 1–2.1)
ALP SERPL-CCNC: 303 U/L (ref 38–126)
ALT SERPL-CCNC: 28 U/L (ref 9–52)
APTT BLD: 34.8 SECONDS (ref 25.6–37.1)
AST SERPL-CCNC: 23 U/L (ref 14–36)
BILIRUB SERPL-MCNC: 0.3 MG/DL (ref 0.2–1.3)
BUN SERPL-MCNC: 17 MG/DL (ref 7–17)
CALCIUM SERPL-MCNC: 8.6 MG/DL (ref 8.4–10.2)
CHLORIDE SERPL-SCNC: 100 MMOL/L (ref 98–107)
CO2 SERPL-SCNC: 26 MMOL/L (ref 22–30)
ERYTHROCYTE [DISTWIDTH] IN BLOOD BY AUTOMATED COUNT: 16.8 % (ref 11.5–14.5)
GLOBULIN SER-MCNC: 3.7 GM/DL (ref 2.2–3.9)
GLUCOSE SERPL-MCNC: 223 MG/DL (ref 65–105)
HCT VFR BLD CALC: 29.8 % (ref 34–47)
MCH RBC QN AUTO: 28.7 PG (ref 27–31)
MCHC RBC AUTO-ENTMCNC: 32.5 G/DL (ref 33–37)
MCV RBC AUTO: 88.1 FL (ref 81–99)
PLATELET # BLD: 308 K/UL (ref 130–400)
POTASSIUM SERPL-SCNC: 4.3 MMOL/L (ref 3.6–5)
PROT SERPL-MCNC: 6.6 G/DL (ref 6.3–8.2)
SODIUM SERPL-SCNC: 133 MMOL/L (ref 132–148)
WBC # BLD AUTO: 10.3 K/UL (ref 4.8–10.8)

## 2017-09-05 RX ADMIN — Medication SCH CAP: at 09:28

## 2017-09-05 RX ADMIN — Medication SCH CAP: at 17:02

## 2017-09-05 RX ADMIN — INSULIN LISPRO SCH UNITS: 100 INJECTION, SUSPENSION SUBCUTANEOUS at 17:04

## 2017-09-05 RX ADMIN — INSULIN LISPRO SCH UNITS: 100 INJECTION, SUSPENSION SUBCUTANEOUS at 09:29

## 2017-09-06 LAB
ALBUMIN/GLOB SERPL: 0.8 {RATIO} (ref 1–2.1)
ALP SERPL-CCNC: 287 U/L (ref 38–126)
ALT SERPL-CCNC: 23 U/L (ref 9–52)
APTT BLD: 34.2 SECONDS (ref 25.6–37.1)
AST SERPL-CCNC: 23 U/L (ref 14–36)
BASOPHILS # BLD AUTO: 0.1 K/UL (ref 0–0.2)
BASOPHILS NFR BLD: 0.7 % (ref 0–2)
BILIRUB SERPL-MCNC: 0.3 MG/DL (ref 0.2–1.3)
BUN SERPL-MCNC: 22 MG/DL (ref 7–17)
CALCIUM SERPL-MCNC: 8.6 MG/DL (ref 8.4–10.2)
CHLORIDE SERPL-SCNC: 103 MMOL/L (ref 98–107)
CO2 SERPL-SCNC: 24 MMOL/L (ref 22–30)
EOSINOPHIL # BLD AUTO: 0.1 K/UL (ref 0–0.7)
EOSINOPHIL NFR BLD: 1.1 % (ref 0–4)
ERYTHROCYTE [DISTWIDTH] IN BLOOD BY AUTOMATED COUNT: 16.8 % (ref 11.5–14.5)
GLOBULIN SER-MCNC: 3.7 GM/DL (ref 2.2–3.9)
GLUCOSE SERPL-MCNC: 310 MG/DL (ref 65–105)
HCT VFR BLD CALC: 29.4 % (ref 34–47)
LYMPHOCYTES # BLD AUTO: 1.4 K/UL (ref 1–4.3)
LYMPHOCYTES NFR BLD AUTO: 14.4 % (ref 20–40)
MCH RBC QN AUTO: 28.5 PG (ref 27–31)
MCHC RBC AUTO-ENTMCNC: 32.3 G/DL (ref 33–37)
MCV RBC AUTO: 88.3 FL (ref 81–99)
MONOCYTES # BLD: 0.8 K/UL (ref 0–0.8)
MONOCYTES NFR BLD: 8.2 % (ref 0–10)
NEUTROPHILS # BLD: 7.2 K/UL (ref 1.8–7)
NEUTROPHILS NFR BLD AUTO: 75.6 % (ref 50–75)
NRBC BLD AUTO-RTO: 0 % (ref 0–0)
PLATELET # BLD: 287 K/UL (ref 130–400)
PMV BLD AUTO: 8.9 FL (ref 7.2–11.7)
POTASSIUM SERPL-SCNC: 4.5 MMOL/L (ref 3.6–5)
PROT SERPL-MCNC: 6.6 G/DL (ref 6.3–8.2)
SODIUM SERPL-SCNC: 134 MMOL/L (ref 132–148)
WBC # BLD AUTO: 9.5 K/UL (ref 4.8–10.8)

## 2017-09-06 RX ADMIN — Medication SCH MG: at 17:54

## 2017-09-06 RX ADMIN — INSULIN LISPRO SCH UNITS: 100 INJECTION, SUSPENSION SUBCUTANEOUS at 09:17

## 2017-09-06 RX ADMIN — Medication SCH CAP: at 09:16

## 2017-09-06 RX ADMIN — INSULIN LISPRO SCH UNITS: 100 INJECTION, SUSPENSION SUBCUTANEOUS at 17:55

## 2017-09-06 RX ADMIN — Medication SCH CAP: at 17:53

## 2017-09-07 LAB
ALBUMIN/GLOB SERPL: 0.8 {RATIO} (ref 1–2.1)
ALP SERPL-CCNC: 292 U/L (ref 38–126)
ALT SERPL-CCNC: 24 U/L (ref 9–52)
AST SERPL-CCNC: 25 U/L (ref 14–36)
BASOPHILS # BLD AUTO: 0 K/UL (ref 0–0.2)
BASOPHILS # BLD AUTO: 0.1 K/UL (ref 0–0.2)
BASOPHILS NFR BLD: 0.5 % (ref 0–2)
BASOPHILS NFR BLD: 0.5 % (ref 0–2)
BILIRUB SERPL-MCNC: 0.3 MG/DL (ref 0.2–1.3)
BUN SERPL-MCNC: 21 MG/DL (ref 7–17)
CALCIUM SERPL-MCNC: 8.5 MG/DL (ref 8.4–10.2)
CHLORIDE SERPL-SCNC: 104 MMOL/L (ref 98–107)
CO2 SERPL-SCNC: 26 MMOL/L (ref 22–30)
EOSINOPHIL # BLD AUTO: 0.1 K/UL (ref 0–0.7)
EOSINOPHIL # BLD AUTO: 0.2 K/UL (ref 0–0.7)
EOSINOPHIL NFR BLD: 0.6 % (ref 0–4)
EOSINOPHIL NFR BLD: 1.8 % (ref 0–4)
ERYTHROCYTE [DISTWIDTH] IN BLOOD BY AUTOMATED COUNT: 16.8 % (ref 11.5–14.5)
ERYTHROCYTE [DISTWIDTH] IN BLOOD BY AUTOMATED COUNT: 17.2 % (ref 11.5–14.5)
GLOBULIN SER-MCNC: 3.9 GM/DL (ref 2.2–3.9)
GLUCOSE SERPL-MCNC: 267 MG/DL (ref 65–105)
HCT VFR BLD CALC: 29 % (ref 34–47)
HCT VFR BLD CALC: 29.3 % (ref 34–47)
LYMPHOCYTES # BLD AUTO: 1.5 K/UL (ref 1–4.3)
LYMPHOCYTES # BLD AUTO: 1.6 K/UL (ref 1–4.3)
LYMPHOCYTES NFR BLD AUTO: 17.1 % (ref 20–40)
LYMPHOCYTES NFR BLD AUTO: 9.1 % (ref 20–40)
MCH RBC QN AUTO: 27.5 PG (ref 27–31)
MCH RBC QN AUTO: 28.2 PG (ref 27–31)
MCHC RBC AUTO-ENTMCNC: 31.1 G/DL (ref 33–37)
MCHC RBC AUTO-ENTMCNC: 31.8 G/DL (ref 33–37)
MCV RBC AUTO: 88.4 FL (ref 81–99)
MCV RBC AUTO: 88.6 FL (ref 81–99)
MONOCYTES # BLD: 0.6 K/UL (ref 0–0.8)
MONOCYTES # BLD: 0.9 K/UL (ref 0–0.8)
MONOCYTES NFR BLD: 10.2 % (ref 0–10)
MONOCYTES NFR BLD: 3.2 % (ref 0–10)
NEUTROPHILS # BLD: 15.5 K/UL (ref 1.8–7)
NEUTROPHILS # BLD: 6.2 K/UL (ref 1.8–7)
NEUTROPHILS NFR BLD AUTO: 70.4 % (ref 50–75)
NEUTROPHILS NFR BLD AUTO: 82 % (ref 42–75)
NEUTROPHILS NFR BLD AUTO: 86.6 % (ref 50–75)
NRBC BLD AUTO-RTO: 0 % (ref 0–0)
NRBC BLD AUTO-RTO: 0 % (ref 0–0)
PLATELET # BLD: 281 K/UL (ref 130–400)
PLATELET # BLD: 371 K/UL (ref 130–400)
PMV BLD AUTO: 8.7 FL (ref 7.2–11.7)
PMV BLD AUTO: 9.1 FL (ref 7.2–11.7)
POTASSIUM SERPL-SCNC: 4.6 MMOL/L (ref 3.6–5)
PROT SERPL-MCNC: 6.8 G/DL (ref 6.3–8.2)
SODIUM SERPL-SCNC: 135 MMOL/L (ref 132–148)
TOTAL CELLS COUNTED BLD: 100
WBC # BLD AUTO: 17.9 K/UL (ref 4.8–10.8)
WBC # BLD AUTO: 8.9 K/UL (ref 4.8–10.8)

## 2017-09-07 PROCEDURE — 0Y6H0Z1 DETACHMENT AT RIGHT LOWER LEG, HIGH, OPEN APPROACH: ICD-10-PCS | Performed by: SURGERY

## 2017-09-07 RX ADMIN — HYDROMORPHONE HYDROCHLORIDE PRN MG: 1 INJECTION, SOLUTION INTRAMUSCULAR; INTRAVENOUS; SUBCUTANEOUS at 17:30

## 2017-09-07 RX ADMIN — Medication SCH: at 20:37

## 2017-09-07 RX ADMIN — HYDROMORPHONE HYDROCHLORIDE PRN MG: 1 INJECTION, SOLUTION INTRAMUSCULAR; INTRAVENOUS; SUBCUTANEOUS at 17:35

## 2017-09-07 RX ADMIN — Medication SCH: at 08:09

## 2017-09-07 RX ADMIN — INSULIN LISPRO SCH: 100 INJECTION, SUSPENSION SUBCUTANEOUS at 08:10

## 2017-09-07 RX ADMIN — INSULIN LISPRO SCH: 100 INJECTION, SUSPENSION SUBCUTANEOUS at 20:38

## 2017-09-07 RX ADMIN — HYDROMORPHONE HYDROCHLORIDE PRN MG: 1 INJECTION, SOLUTION INTRAMUSCULAR; INTRAVENOUS; SUBCUTANEOUS at 17:45

## 2017-09-07 RX ADMIN — HYDROMORPHONE HYDROCHLORIDE PRN MG: 1 INJECTION, SOLUTION INTRAMUSCULAR; INTRAVENOUS; SUBCUTANEOUS at 19:05

## 2017-09-07 RX ADMIN — HYDROMORPHONE HYDROCHLORIDE PRN MG: 1 INJECTION, SOLUTION INTRAMUSCULAR; INTRAVENOUS; SUBCUTANEOUS at 19:55

## 2017-09-07 RX ADMIN — Medication SCH: at 20:35

## 2017-09-07 RX ADMIN — Medication SCH: at 08:10

## 2017-09-08 ENCOUNTER — HOSPITAL ENCOUNTER (INPATIENT)
Dept: HOSPITAL 14 - H.REHABAC | Age: 57
LOS: 17 days | Discharge: HOME | DRG: 949 | End: 2017-09-25
Attending: FAMILY MEDICINE | Admitting: FAMILY MEDICINE
Payer: MEDICARE

## 2017-09-08 VITALS
HEART RATE: 80 BPM | TEMPERATURE: 98.5 F | DIASTOLIC BLOOD PRESSURE: 64 MMHG | SYSTOLIC BLOOD PRESSURE: 131 MMHG | OXYGEN SATURATION: 98 % | RESPIRATION RATE: 20 BRPM

## 2017-09-08 VITALS — BODY MASS INDEX: 38.7 KG/M2

## 2017-09-08 DIAGNOSIS — Z88.1: ICD-10-CM

## 2017-09-08 DIAGNOSIS — L03.115: ICD-10-CM

## 2017-09-08 DIAGNOSIS — J45.909: ICD-10-CM

## 2017-09-08 DIAGNOSIS — G89.29: ICD-10-CM

## 2017-09-08 DIAGNOSIS — E11.42: ICD-10-CM

## 2017-09-08 DIAGNOSIS — I10: ICD-10-CM

## 2017-09-08 DIAGNOSIS — K59.00: ICD-10-CM

## 2017-09-08 DIAGNOSIS — E78.00: ICD-10-CM

## 2017-09-08 DIAGNOSIS — Z79.84: ICD-10-CM

## 2017-09-08 DIAGNOSIS — Z47.81: ICD-10-CM

## 2017-09-08 DIAGNOSIS — D64.9: ICD-10-CM

## 2017-09-08 DIAGNOSIS — Z89.511: ICD-10-CM

## 2017-09-08 DIAGNOSIS — Z48.812: Primary | ICD-10-CM

## 2017-09-08 DIAGNOSIS — F17.210: ICD-10-CM

## 2017-09-08 DIAGNOSIS — G54.6: ICD-10-CM

## 2017-09-08 DIAGNOSIS — E66.9: ICD-10-CM

## 2017-09-08 DIAGNOSIS — E11.51: ICD-10-CM

## 2017-09-08 DIAGNOSIS — L60.2: ICD-10-CM

## 2017-09-08 DIAGNOSIS — Z23: ICD-10-CM

## 2017-09-08 LAB
BUN SERPL-MCNC: 16 MG/DL (ref 7–17)
CALCIUM SERPL-MCNC: 8.4 MG/DL (ref 8.4–10.2)
CHLORIDE SERPL-SCNC: 101 MMOL/L (ref 98–107)
CO2 SERPL-SCNC: 24 MMOL/L (ref 22–30)
ERYTHROCYTE [DISTWIDTH] IN BLOOD BY AUTOMATED COUNT: 17 % (ref 11.5–14.5)
GLUCOSE SERPL-MCNC: 294 MG/DL (ref 65–105)
HCT VFR BLD CALC: 27.2 % (ref 34–47)
MCH RBC QN AUTO: 27.9 PG (ref 27–31)
MCHC RBC AUTO-ENTMCNC: 31.9 G/DL (ref 33–37)
MCV RBC AUTO: 87.3 FL (ref 81–99)
PLATELET # BLD: 324 K/UL (ref 130–400)
POTASSIUM SERPL-SCNC: 4.4 MMOL/L (ref 3.6–5)
SODIUM SERPL-SCNC: 131 MMOL/L (ref 132–148)
WBC # BLD AUTO: 12.3 K/UL (ref 4.8–10.8)

## 2017-09-08 PROCEDURE — F07Z9FZ GAIT TRAINING/FUNCTIONAL AMBULATION TREATMENT USING ASSISTIVE, ADAPTIVE, SUPPORTIVE OR PROTECTIVE EQUIPMENT: ICD-10-PCS

## 2017-09-08 PROCEDURE — F08Z4FZ HOME MANAGEMENT TREATMENT USING ASSISTIVE, ADAPTIVE, SUPPORTIVE OR PROTECTIVE EQUIPMENT: ICD-10-PCS

## 2017-09-08 RX ADMIN — INSULIN LISPRO SCH UNITS: 100 INJECTION, SUSPENSION SUBCUTANEOUS at 16:15

## 2017-09-08 RX ADMIN — Medication SCH MG: at 08:45

## 2017-09-08 RX ADMIN — Medication SCH CAP: at 09:14

## 2017-09-08 RX ADMIN — NYSTATIN AND TRIAMCINOLONE ACETONIDE SCH APPLIC: 100000; 1 OINTMENT TOPICAL at 16:05

## 2017-09-08 RX ADMIN — OXYCODONE HYDROCHLORIDE AND ACETAMINOPHEN PRN TAB: 5; 325 TABLET ORAL at 23:52

## 2017-09-08 RX ADMIN — Medication SCH MG: at 16:05

## 2017-09-08 RX ADMIN — NYSTATIN AND TRIAMCINOLONE ACETONIDE SCH APPLIC: 100000; 1 OINTMENT TOPICAL at 13:00

## 2017-09-08 RX ADMIN — NYSTATIN AND TRIAMCINOLONE ACETONIDE SCH APPLIC: 100000; 1 OINTMENT TOPICAL at 08:50

## 2017-09-08 RX ADMIN — Medication SCH CAP: at 16:14

## 2017-09-09 LAB
BASOPHILS # BLD AUTO: 0.1 K/UL (ref 0–0.2)
BASOPHILS NFR BLD: 0.9 % (ref 0–2)
EOSINOPHIL # BLD AUTO: 0.1 K/UL (ref 0–0.7)
EOSINOPHIL NFR BLD: 1.5 % (ref 0–4)
ERYTHROCYTE [DISTWIDTH] IN BLOOD BY AUTOMATED COUNT: 17.1 % (ref 11.5–14.5)
HCT VFR BLD CALC: 25 % (ref 34–47)
LYMPHOCYTES # BLD AUTO: 1.6 K/UL (ref 1–4.3)
LYMPHOCYTES NFR BLD AUTO: 17.8 % (ref 20–40)
MCH RBC QN AUTO: 27.9 PG (ref 27–31)
MCHC RBC AUTO-ENTMCNC: 31.8 G/DL (ref 33–37)
MCV RBC AUTO: 87.6 FL (ref 81–99)
MONOCYTES # BLD: 0.8 K/UL (ref 0–0.8)
MONOCYTES NFR BLD: 9 % (ref 0–10)
NEUTROPHILS # BLD: 6.2 K/UL (ref 1.8–7)
NEUTROPHILS NFR BLD AUTO: 70.8 % (ref 50–75)
NRBC BLD AUTO-RTO: 0 % (ref 0–0)
PLATELET # BLD: 271 K/UL (ref 130–400)
PMV BLD AUTO: 8.6 FL (ref 7.2–11.7)
WBC # BLD AUTO: 8.8 K/UL (ref 4.8–10.8)

## 2017-09-09 PROCEDURE — F07L6FZ THERAPEUTIC EXERCISE TREATMENT OF MUSCULOSKELETAL SYSTEM - LOWER BACK / LOWER EXTREMITY USING ASSISTIVE, ADAPTIVE, SUPPORTIVE OR PROTECTIVE EQUIPMENT: ICD-10-PCS

## 2017-09-09 RX ADMIN — NYSTATIN AND TRIAMCINOLONE ACETONIDE SCH APPLIC: 100000; 1 OINTMENT TOPICAL at 10:06

## 2017-09-09 RX ADMIN — INSULIN LISPRO SCH UNITS: 100 INJECTION, SUSPENSION SUBCUTANEOUS at 09:08

## 2017-09-09 RX ADMIN — OXYCODONE HYDROCHLORIDE AND ACETAMINOPHEN PRN TAB: 5; 325 TABLET ORAL at 09:03

## 2017-09-09 RX ADMIN — OXYCODONE HYDROCHLORIDE SCH MG: 20 TABLET, FILM COATED, EXTENDED RELEASE ORAL at 21:17

## 2017-09-09 RX ADMIN — INSULIN LISPRO SCH UNITS: 100 INJECTION, SUSPENSION SUBCUTANEOUS at 17:02

## 2017-09-09 RX ADMIN — OXYCODONE HYDROCHLORIDE SCH MG: 20 TABLET, FILM COATED, EXTENDED RELEASE ORAL at 13:33

## 2017-09-09 RX ADMIN — NYSTATIN AND TRIAMCINOLONE ACETONIDE SCH APPLIC: 100000; 1 OINTMENT TOPICAL at 16:59

## 2017-09-09 RX ADMIN — Medication SCH CAP: at 16:57

## 2017-09-09 RX ADMIN — Medication SCH MG: at 17:01

## 2017-09-09 RX ADMIN — PANTOPRAZOLE SODIUM SCH MG: 40 TABLET, DELAYED RELEASE ORAL at 09:06

## 2017-09-09 RX ADMIN — NYSTATIN AND TRIAMCINOLONE ACETONIDE SCH APPLIC: 100000; 1 OINTMENT TOPICAL at 13:34

## 2017-09-09 RX ADMIN — Medication SCH MG: at 09:06

## 2017-09-09 RX ADMIN — Medication SCH CAP: at 09:03

## 2017-09-09 RX ADMIN — OXYCODONE HYDROCHLORIDE AND ACETAMINOPHEN PRN TAB: 5; 325 TABLET ORAL at 04:20

## 2017-09-10 RX ADMIN — NYSTATIN AND TRIAMCINOLONE ACETONIDE SCH APPLIC: 100000; 1 OINTMENT TOPICAL at 16:51

## 2017-09-10 RX ADMIN — NYSTATIN AND TRIAMCINOLONE ACETONIDE SCH APPLIC: 100000; 1 OINTMENT TOPICAL at 12:28

## 2017-09-10 RX ADMIN — OXYCODONE HYDROCHLORIDE SCH MG: 20 TABLET, FILM COATED, EXTENDED RELEASE ORAL at 21:08

## 2017-09-10 RX ADMIN — PANTOPRAZOLE SODIUM SCH MG: 40 TABLET, DELAYED RELEASE ORAL at 08:19

## 2017-09-10 RX ADMIN — OXYCODONE HYDROCHLORIDE SCH MG: 20 TABLET, FILM COATED, EXTENDED RELEASE ORAL at 06:03

## 2017-09-10 RX ADMIN — Medication SCH MG: at 16:55

## 2017-09-10 RX ADMIN — INSULIN LISPRO SCH UNITS: 100 INJECTION, SUSPENSION SUBCUTANEOUS at 16:56

## 2017-09-10 RX ADMIN — NYSTATIN AND TRIAMCINOLONE ACETONIDE SCH APPLIC: 100000; 1 OINTMENT TOPICAL at 08:19

## 2017-09-10 RX ADMIN — Medication SCH MG: at 08:19

## 2017-09-10 RX ADMIN — OXYCODONE HYDROCHLORIDE SCH MG: 20 TABLET, FILM COATED, EXTENDED RELEASE ORAL at 13:12

## 2017-09-10 RX ADMIN — Medication SCH CAP: at 08:18

## 2017-09-10 RX ADMIN — INSULIN LISPRO SCH UNITS: 100 INJECTION, SUSPENSION SUBCUTANEOUS at 08:19

## 2017-09-10 RX ADMIN — Medication SCH CAP: at 16:55

## 2017-09-11 LAB
BUN SERPL-MCNC: 12 MG/DL (ref 7–17)
CALCIUM SERPL-MCNC: 8.6 MG/DL (ref 8.4–10.2)
CHLORIDE SERPL-SCNC: 102 MMOL/L (ref 98–107)
CO2 SERPL-SCNC: 28 MMOL/L (ref 22–30)
ERYTHROCYTE [DISTWIDTH] IN BLOOD BY AUTOMATED COUNT: 17.5 % (ref 11.5–14.5)
GLUCOSE SERPL-MCNC: 151 MG/DL (ref 65–105)
HCT VFR BLD CALC: 25.5 % (ref 34–47)
MCH RBC QN AUTO: 28.5 PG (ref 27–31)
MCHC RBC AUTO-ENTMCNC: 32.4 G/DL (ref 33–37)
MCV RBC AUTO: 88.2 FL (ref 81–99)
PLATELET # BLD: 255 K/UL (ref 130–400)
POTASSIUM SERPL-SCNC: 4.5 MMOL/L (ref 3.6–5)
SODIUM SERPL-SCNC: 135 MMOL/L (ref 132–148)
WBC # BLD AUTO: 7.1 K/UL (ref 4.8–10.8)

## 2017-09-11 RX ADMIN — INSULIN LISPRO SCH UNITS: 100 INJECTION, SUSPENSION SUBCUTANEOUS at 16:58

## 2017-09-11 RX ADMIN — CHOLESTYRAMINE SCH GM: 4 POWDER, FOR SUSPENSION ORAL at 17:03

## 2017-09-11 RX ADMIN — OXYCODONE HYDROCHLORIDE SCH MG: 20 TABLET, FILM COATED, EXTENDED RELEASE ORAL at 21:48

## 2017-09-11 RX ADMIN — INSULIN LISPRO SCH UNITS: 100 INJECTION, SUSPENSION SUBCUTANEOUS at 08:56

## 2017-09-11 RX ADMIN — Medication SCH CAP: at 08:31

## 2017-09-11 RX ADMIN — NYSTATIN AND TRIAMCINOLONE ACETONIDE SCH APPLIC: 100000; 1 OINTMENT TOPICAL at 12:27

## 2017-09-11 RX ADMIN — CHOLESTYRAMINE SCH GM: 4 POWDER, FOR SUSPENSION ORAL at 14:01

## 2017-09-11 RX ADMIN — Medication SCH MG: at 16:57

## 2017-09-11 RX ADMIN — PANTOPRAZOLE SODIUM SCH MG: 40 TABLET, DELAYED RELEASE ORAL at 08:17

## 2017-09-11 RX ADMIN — NYSTATIN AND TRIAMCINOLONE ACETONIDE SCH APPLIC: 100000; 1 OINTMENT TOPICAL at 08:17

## 2017-09-11 RX ADMIN — OXYCODONE HYDROCHLORIDE SCH MG: 20 TABLET, FILM COATED, EXTENDED RELEASE ORAL at 14:00

## 2017-09-11 RX ADMIN — Medication SCH CAP: at 17:07

## 2017-09-11 RX ADMIN — OXYCODONE HYDROCHLORIDE SCH MG: 20 TABLET, FILM COATED, EXTENDED RELEASE ORAL at 05:13

## 2017-09-11 RX ADMIN — Medication SCH MG: at 08:16

## 2017-09-11 RX ADMIN — NYSTATIN AND TRIAMCINOLONE ACETONIDE SCH APPLIC: 100000; 1 OINTMENT TOPICAL at 17:03

## 2017-09-12 RX ADMIN — NYSTATIN AND TRIAMCINOLONE ACETONIDE SCH: 100000; 1 OINTMENT TOPICAL at 12:16

## 2017-09-12 RX ADMIN — OXYCODONE HYDROCHLORIDE SCH MG: 20 TABLET, FILM COATED, EXTENDED RELEASE ORAL at 21:31

## 2017-09-12 RX ADMIN — INSULIN LISPRO SCH UNITS: 100 INJECTION, SUSPENSION SUBCUTANEOUS at 17:19

## 2017-09-12 RX ADMIN — NYSTATIN AND TRIAMCINOLONE ACETONIDE SCH APPLIC: 100000; 1 OINTMENT TOPICAL at 08:21

## 2017-09-12 RX ADMIN — PANTOPRAZOLE SODIUM SCH MG: 40 TABLET, DELAYED RELEASE ORAL at 08:21

## 2017-09-12 RX ADMIN — NYSTATIN AND TRIAMCINOLONE ACETONIDE SCH APPLIC: 100000; 1 OINTMENT TOPICAL at 17:18

## 2017-09-12 RX ADMIN — Medication SCH MG: at 08:12

## 2017-09-12 RX ADMIN — INSULIN LISPRO SCH UNITS: 100 INJECTION, SUSPENSION SUBCUTANEOUS at 08:16

## 2017-09-12 RX ADMIN — CHOLESTYRAMINE SCH GM: 4 POWDER, FOR SUSPENSION ORAL at 12:16

## 2017-09-12 RX ADMIN — Medication SCH CAP: at 08:11

## 2017-09-12 RX ADMIN — Medication SCH MG: at 17:17

## 2017-09-12 RX ADMIN — OXYCODONE HYDROCHLORIDE SCH MG: 20 TABLET, FILM COATED, EXTENDED RELEASE ORAL at 13:54

## 2017-09-12 RX ADMIN — CHOLESTYRAMINE SCH GM: 4 POWDER, FOR SUSPENSION ORAL at 17:18

## 2017-09-12 RX ADMIN — OXYCODONE HYDROCHLORIDE SCH MG: 20 TABLET, FILM COATED, EXTENDED RELEASE ORAL at 06:11

## 2017-09-12 RX ADMIN — Medication SCH CAP: at 17:23

## 2017-09-12 RX ADMIN — CHOLESTYRAMINE SCH GM: 4 POWDER, FOR SUSPENSION ORAL at 08:21

## 2017-09-13 RX ADMIN — OXYCODONE HYDROCHLORIDE SCH MG: 20 TABLET, FILM COATED, EXTENDED RELEASE ORAL at 06:40

## 2017-09-13 RX ADMIN — INSULIN LISPRO SCH UNITS: 100 INJECTION, SUSPENSION SUBCUTANEOUS at 08:24

## 2017-09-13 RX ADMIN — Medication SCH CAP: at 16:24

## 2017-09-13 RX ADMIN — Medication SCH MG: at 16:25

## 2017-09-13 RX ADMIN — OXYCODONE HYDROCHLORIDE SCH MG: 20 TABLET, FILM COATED, EXTENDED RELEASE ORAL at 21:46

## 2017-09-13 RX ADMIN — PANTOPRAZOLE SODIUM SCH MG: 40 TABLET, DELAYED RELEASE ORAL at 08:27

## 2017-09-13 RX ADMIN — CHOLESTYRAMINE SCH: 4 POWDER, FOR SUSPENSION ORAL at 12:28

## 2017-09-13 RX ADMIN — CHOLESTYRAMINE SCH GM: 4 POWDER, FOR SUSPENSION ORAL at 08:22

## 2017-09-13 RX ADMIN — INSULIN LISPRO SCH UNITS: 100 INJECTION, SUSPENSION SUBCUTANEOUS at 16:26

## 2017-09-13 RX ADMIN — CHOLESTYRAMINE SCH: 4 POWDER, FOR SUSPENSION ORAL at 16:29

## 2017-09-13 RX ADMIN — NYSTATIN AND TRIAMCINOLONE ACETONIDE SCH APPLIC: 100000; 1 OINTMENT TOPICAL at 16:28

## 2017-09-13 RX ADMIN — Medication SCH CAP: at 08:20

## 2017-09-13 RX ADMIN — CHOLESTYRAMINE SCH: 4 POWDER, FOR SUSPENSION ORAL at 12:26

## 2017-09-13 RX ADMIN — NYSTATIN AND TRIAMCINOLONE ACETONIDE SCH APPLIC: 100000; 1 OINTMENT TOPICAL at 08:27

## 2017-09-13 RX ADMIN — OXYCODONE HYDROCHLORIDE SCH MG: 20 TABLET, FILM COATED, EXTENDED RELEASE ORAL at 13:20

## 2017-09-13 RX ADMIN — Medication SCH: at 08:23

## 2017-09-13 RX ADMIN — NYSTATIN AND TRIAMCINOLONE ACETONIDE SCH APPLIC: 100000; 1 OINTMENT TOPICAL at 12:25

## 2017-09-14 LAB
ALBUMIN/GLOB SERPL: 0.8 {RATIO} (ref 1–2.1)
ALP SERPL-CCNC: 193 U/L (ref 38–126)
ALT SERPL-CCNC: 20 U/L (ref 9–52)
AST SERPL-CCNC: 25 U/L (ref 14–36)
BASOPHILS # BLD AUTO: 0.1 K/UL (ref 0–0.2)
BASOPHILS NFR BLD: 0.6 % (ref 0–2)
BILIRUB SERPL-MCNC: 0.3 MG/DL (ref 0.2–1.3)
BUN SERPL-MCNC: 12 MG/DL (ref 7–17)
CALCIUM SERPL-MCNC: 8.3 MG/DL (ref 8.4–10.2)
CHLORIDE SERPL-SCNC: 103 MMOL/L (ref 98–107)
CO2 SERPL-SCNC: 24 MMOL/L (ref 22–30)
EOSINOPHIL # BLD AUTO: 0.4 K/UL (ref 0–0.7)
EOSINOPHIL NFR BLD: 4.7 % (ref 0–4)
ERYTHROCYTE [DISTWIDTH] IN BLOOD BY AUTOMATED COUNT: 17.3 % (ref 11.5–14.5)
GLOBULIN SER-MCNC: 3.6 GM/DL (ref 2.2–3.9)
GLUCOSE SERPL-MCNC: 210 MG/DL (ref 65–105)
HCT VFR BLD CALC: 26.6 % (ref 34–47)
LYMPHOCYTES # BLD AUTO: 1.6 K/UL (ref 1–4.3)
LYMPHOCYTES NFR BLD AUTO: 19 % (ref 20–40)
MCH RBC QN AUTO: 28.3 PG (ref 27–31)
MCHC RBC AUTO-ENTMCNC: 32.2 G/DL (ref 33–37)
MCV RBC AUTO: 88 FL (ref 81–99)
MONOCYTES # BLD: 0.8 K/UL (ref 0–0.8)
MONOCYTES NFR BLD: 9.5 % (ref 0–10)
NEUTROPHILS # BLD: 5.7 K/UL (ref 1.8–7)
NEUTROPHILS NFR BLD AUTO: 66.2 % (ref 50–75)
NRBC BLD AUTO-RTO: 0.1 % (ref 0–0)
PLATELET # BLD: 255 K/UL (ref 130–400)
PMV BLD AUTO: 9.1 FL (ref 7.2–11.7)
POTASSIUM SERPL-SCNC: 4.2 MMOL/L (ref 3.6–5)
PROT SERPL-MCNC: 6.6 G/DL (ref 6.3–8.2)
SODIUM SERPL-SCNC: 135 MMOL/L (ref 132–148)
WBC # BLD AUTO: 8.7 K/UL (ref 4.8–10.8)

## 2017-09-14 RX ADMIN — NYSTATIN AND TRIAMCINOLONE ACETONIDE SCH APPLIC: 100000; 1 OINTMENT TOPICAL at 16:37

## 2017-09-14 RX ADMIN — INSULIN LISPRO SCH UNITS: 100 INJECTION, SUSPENSION SUBCUTANEOUS at 08:15

## 2017-09-14 RX ADMIN — NYSTATIN AND TRIAMCINOLONE ACETONIDE SCH APPLIC: 100000; 1 OINTMENT TOPICAL at 08:16

## 2017-09-14 RX ADMIN — OXYCODONE HYDROCHLORIDE SCH MG: 20 TABLET, FILM COATED, EXTENDED RELEASE ORAL at 06:21

## 2017-09-14 RX ADMIN — PANTOPRAZOLE SODIUM SCH MG: 40 TABLET, DELAYED RELEASE ORAL at 08:16

## 2017-09-14 RX ADMIN — OXYCODONE HYDROCHLORIDE SCH MG: 20 TABLET, FILM COATED, EXTENDED RELEASE ORAL at 14:13

## 2017-09-14 RX ADMIN — NYSTATIN AND TRIAMCINOLONE ACETONIDE SCH APPLIC: 100000; 1 OINTMENT TOPICAL at 12:33

## 2017-09-14 RX ADMIN — Medication SCH MG: at 16:37

## 2017-09-14 RX ADMIN — INSULIN LISPRO SCH UNITS: 100 INJECTION, SUSPENSION SUBCUTANEOUS at 16:36

## 2017-09-14 RX ADMIN — CHOLESTYRAMINE SCH GM: 4 POWDER, FOR SUSPENSION ORAL at 08:17

## 2017-09-14 RX ADMIN — Medication SCH CAP: at 08:15

## 2017-09-14 RX ADMIN — Medication SCH MG: at 08:17

## 2017-09-14 RX ADMIN — OXYCODONE HYDROCHLORIDE SCH MG: 20 TABLET, FILM COATED, EXTENDED RELEASE ORAL at 21:30

## 2017-09-14 RX ADMIN — CHOLESTYRAMINE SCH GM: 4 POWDER, FOR SUSPENSION ORAL at 16:36

## 2017-09-14 RX ADMIN — CHOLESTYRAMINE SCH GM: 4 POWDER, FOR SUSPENSION ORAL at 12:32

## 2017-09-14 RX ADMIN — Medication SCH CAP: at 16:35

## 2017-09-15 RX ADMIN — NYSTATIN AND TRIAMCINOLONE ACETONIDE SCH APPLIC: 100000; 1 OINTMENT TOPICAL at 09:01

## 2017-09-15 RX ADMIN — PANTOPRAZOLE SODIUM SCH MG: 40 TABLET, DELAYED RELEASE ORAL at 09:01

## 2017-09-15 RX ADMIN — INSULIN LISPRO SCH UNITS: 100 INJECTION, SUSPENSION SUBCUTANEOUS at 09:02

## 2017-09-15 RX ADMIN — OXYCODONE HYDROCHLORIDE SCH MG: 20 TABLET, FILM COATED, EXTENDED RELEASE ORAL at 21:06

## 2017-09-15 RX ADMIN — CHOLESTYRAMINE SCH GM: 4 POWDER, FOR SUSPENSION ORAL at 09:01

## 2017-09-15 RX ADMIN — OXYCODONE HYDROCHLORIDE SCH MG: 20 TABLET, FILM COATED, EXTENDED RELEASE ORAL at 14:00

## 2017-09-15 RX ADMIN — Medication SCH CAP: at 08:59

## 2017-09-15 RX ADMIN — CHOLESTYRAMINE SCH GM: 4 POWDER, FOR SUSPENSION ORAL at 17:46

## 2017-09-15 RX ADMIN — NYSTATIN AND TRIAMCINOLONE ACETONIDE SCH APPLIC: 100000; 1 OINTMENT TOPICAL at 18:06

## 2017-09-15 RX ADMIN — INSULIN LISPRO SCH UNITS: 100 INJECTION, SUSPENSION SUBCUTANEOUS at 17:48

## 2017-09-15 RX ADMIN — CHOLESTYRAMINE SCH GM: 4 POWDER, FOR SUSPENSION ORAL at 12:15

## 2017-09-15 RX ADMIN — OXYCODONE HYDROCHLORIDE SCH MG: 20 TABLET, FILM COATED, EXTENDED RELEASE ORAL at 05:57

## 2017-09-15 RX ADMIN — Medication SCH CAP: at 18:08

## 2017-09-15 RX ADMIN — Medication SCH MG: at 17:48

## 2017-09-15 RX ADMIN — Medication SCH MG: at 09:00

## 2017-09-15 RX ADMIN — NYSTATIN AND TRIAMCINOLONE ACETONIDE SCH APPLIC: 100000; 1 OINTMENT TOPICAL at 12:14

## 2017-09-16 RX ADMIN — NYSTATIN AND TRIAMCINOLONE ACETONIDE SCH APPLIC: 100000; 1 OINTMENT TOPICAL at 13:29

## 2017-09-16 RX ADMIN — NYSTATIN AND TRIAMCINOLONE ACETONIDE SCH APPLIC: 100000; 1 OINTMENT TOPICAL at 17:00

## 2017-09-16 RX ADMIN — CHOLESTYRAMINE SCH GM: 4 POWDER, FOR SUSPENSION ORAL at 17:09

## 2017-09-16 RX ADMIN — INSULIN LISPRO SCH UNITS: 100 INJECTION, SUSPENSION SUBCUTANEOUS at 17:09

## 2017-09-16 RX ADMIN — OXYCODONE HYDROCHLORIDE SCH MG: 20 TABLET, FILM COATED, EXTENDED RELEASE ORAL at 13:28

## 2017-09-16 RX ADMIN — CHOLESTYRAMINE SCH GM: 4 POWDER, FOR SUSPENSION ORAL at 13:28

## 2017-09-16 RX ADMIN — Medication SCH MG: at 17:09

## 2017-09-16 RX ADMIN — CHOLESTYRAMINE SCH GM: 4 POWDER, FOR SUSPENSION ORAL at 08:36

## 2017-09-16 RX ADMIN — Medication SCH CAP: at 17:09

## 2017-09-16 RX ADMIN — INSULIN LISPRO SCH UNITS: 100 INJECTION, SUSPENSION SUBCUTANEOUS at 08:36

## 2017-09-16 RX ADMIN — OXYCODONE HYDROCHLORIDE SCH MG: 20 TABLET, FILM COATED, EXTENDED RELEASE ORAL at 21:32

## 2017-09-16 RX ADMIN — PANTOPRAZOLE SODIUM SCH MG: 40 TABLET, DELAYED RELEASE ORAL at 08:35

## 2017-09-16 RX ADMIN — OXYCODONE HYDROCHLORIDE SCH MG: 20 TABLET, FILM COATED, EXTENDED RELEASE ORAL at 05:01

## 2017-09-16 RX ADMIN — NYSTATIN AND TRIAMCINOLONE ACETONIDE SCH APPLIC: 100000; 1 OINTMENT TOPICAL at 08:35

## 2017-09-16 RX ADMIN — Medication SCH CAP: at 08:33

## 2017-09-16 RX ADMIN — Medication SCH MG: at 08:38

## 2017-09-17 RX ADMIN — Medication SCH CAP: at 16:42

## 2017-09-17 RX ADMIN — OXYCODONE HYDROCHLORIDE SCH MG: 20 TABLET, FILM COATED, EXTENDED RELEASE ORAL at 13:03

## 2017-09-17 RX ADMIN — CHOLESTYRAMINE SCH: 4 POWDER, FOR SUSPENSION ORAL at 08:18

## 2017-09-17 RX ADMIN — INSULIN LISPRO SCH UNITS: 100 INJECTION, SUSPENSION SUBCUTANEOUS at 08:13

## 2017-09-17 RX ADMIN — OXYCODONE HYDROCHLORIDE SCH MG: 20 TABLET, FILM COATED, EXTENDED RELEASE ORAL at 06:25

## 2017-09-17 RX ADMIN — INSULIN LISPRO SCH UNITS: 100 INJECTION, SUSPENSION SUBCUTANEOUS at 16:39

## 2017-09-17 RX ADMIN — Medication SCH CAP: at 08:12

## 2017-09-17 RX ADMIN — OXYCODONE HYDROCHLORIDE SCH MG: 20 TABLET, FILM COATED, EXTENDED RELEASE ORAL at 21:20

## 2017-09-17 RX ADMIN — NYSTATIN AND TRIAMCINOLONE ACETONIDE SCH APPLIC: 100000; 1 OINTMENT TOPICAL at 08:13

## 2017-09-17 RX ADMIN — Medication SCH MG: at 08:12

## 2017-09-17 RX ADMIN — NYSTATIN AND TRIAMCINOLONE ACETONIDE SCH APPLIC: 100000; 1 OINTMENT TOPICAL at 13:03

## 2017-09-17 RX ADMIN — Medication SCH MG: at 16:43

## 2017-09-17 RX ADMIN — PANTOPRAZOLE SODIUM SCH MG: 40 TABLET, DELAYED RELEASE ORAL at 08:12

## 2017-09-17 RX ADMIN — NYSTATIN AND TRIAMCINOLONE ACETONIDE SCH APPLIC: 100000; 1 OINTMENT TOPICAL at 16:41

## 2017-09-18 RX ADMIN — Medication SCH MG: at 08:25

## 2017-09-18 RX ADMIN — NYSTATIN AND TRIAMCINOLONE ACETONIDE SCH APPLIC: 100000; 1 OINTMENT TOPICAL at 17:22

## 2017-09-18 RX ADMIN — BISMUTH SUBSALICYLATE PRN MG: 525 SUSPENSION ORAL at 12:04

## 2017-09-18 RX ADMIN — PANTOPRAZOLE SODIUM SCH MG: 40 TABLET, DELAYED RELEASE ORAL at 08:28

## 2017-09-18 RX ADMIN — NYSTATIN AND TRIAMCINOLONE ACETONIDE SCH APPLIC: 100000; 1 OINTMENT TOPICAL at 08:27

## 2017-09-18 RX ADMIN — INSULIN LISPRO SCH UNITS: 100 INJECTION, SUSPENSION SUBCUTANEOUS at 17:10

## 2017-09-18 RX ADMIN — NYSTATIN AND TRIAMCINOLONE ACETONIDE SCH APPLIC: 100000; 1 OINTMENT TOPICAL at 12:05

## 2017-09-18 RX ADMIN — INSULIN LISPRO SCH UNITS: 100 INJECTION, SUSPENSION SUBCUTANEOUS at 08:25

## 2017-09-18 RX ADMIN — BISMUTH SUBSALICYLATE PRN MG: 525 SUSPENSION ORAL at 17:09

## 2017-09-18 RX ADMIN — Medication SCH CAP: at 08:24

## 2017-09-18 RX ADMIN — OXYCODONE HYDROCHLORIDE SCH MG: 20 TABLET, FILM COATED, EXTENDED RELEASE ORAL at 06:25

## 2017-09-18 RX ADMIN — OXYCODONE HYDROCHLORIDE SCH MG: 20 TABLET, FILM COATED, EXTENDED RELEASE ORAL at 21:23

## 2017-09-18 RX ADMIN — Medication SCH CAP: at 17:08

## 2017-09-18 RX ADMIN — Medication SCH MG: at 17:09

## 2017-09-18 RX ADMIN — BISMUTH SUBSALICYLATE PRN MG: 525 SUSPENSION ORAL at 21:46

## 2017-09-18 RX ADMIN — OXYCODONE HYDROCHLORIDE SCH MG: 20 TABLET, FILM COATED, EXTENDED RELEASE ORAL at 13:45

## 2017-09-19 RX ADMIN — NYSTATIN AND TRIAMCINOLONE ACETONIDE SCH: 100000; 1 OINTMENT TOPICAL at 12:50

## 2017-09-19 RX ADMIN — Medication SCH CAP: at 17:42

## 2017-09-19 RX ADMIN — Medication SCH MG: at 07:59

## 2017-09-19 RX ADMIN — Medication SCH CAP: at 08:05

## 2017-09-19 RX ADMIN — Medication SCH MG: at 17:43

## 2017-09-19 RX ADMIN — INSULIN LISPRO SCH UNITS: 100 INJECTION, SUSPENSION SUBCUTANEOUS at 17:43

## 2017-09-19 RX ADMIN — OXYCODONE HYDROCHLORIDE SCH: 20 TABLET, FILM COATED, EXTENDED RELEASE ORAL at 06:25

## 2017-09-19 RX ADMIN — PANTOPRAZOLE SODIUM SCH MG: 40 TABLET, DELAYED RELEASE ORAL at 08:01

## 2017-09-19 RX ADMIN — NYSTATIN AND TRIAMCINOLONE ACETONIDE SCH APPLIC: 100000; 1 OINTMENT TOPICAL at 17:44

## 2017-09-19 RX ADMIN — NYSTATIN AND TRIAMCINOLONE ACETONIDE SCH APPLIC: 100000; 1 OINTMENT TOPICAL at 08:03

## 2017-09-19 RX ADMIN — INSULIN LISPRO SCH UNITS: 100 INJECTION, SUSPENSION SUBCUTANEOUS at 08:02

## 2017-09-19 RX ADMIN — BISMUTH SUBSALICYLATE PRN MG: 525 SUSPENSION ORAL at 16:34

## 2017-09-20 RX ADMIN — NYSTATIN AND TRIAMCINOLONE ACETONIDE SCH APPLIC: 100000; 1 OINTMENT TOPICAL at 08:33

## 2017-09-20 RX ADMIN — NYSTATIN AND TRIAMCINOLONE ACETONIDE SCH APPLIC: 100000; 1 OINTMENT TOPICAL at 17:05

## 2017-09-20 RX ADMIN — Medication SCH CAP: at 08:33

## 2017-09-20 RX ADMIN — OXYCODONE HYDROCHLORIDE SCH MG: 20 TABLET, FILM COATED, EXTENDED RELEASE ORAL at 21:54

## 2017-09-20 RX ADMIN — Medication SCH MG: at 08:34

## 2017-09-20 RX ADMIN — INSULIN LISPRO SCH UNITS: 100 INJECTION, SUSPENSION SUBCUTANEOUS at 08:34

## 2017-09-20 RX ADMIN — Medication SCH CAP: at 17:05

## 2017-09-20 RX ADMIN — VITAMIN A AND VITAMIN D SCH EA: 929.3 OINTMENT TOPICAL at 17:06

## 2017-09-20 RX ADMIN — PANTOPRAZOLE SODIUM SCH MG: 40 TABLET, DELAYED RELEASE ORAL at 08:33

## 2017-09-20 RX ADMIN — BISMUTH SUBSALICYLATE PRN MG: 525 SUSPENSION ORAL at 03:15

## 2017-09-20 RX ADMIN — NYSTATIN AND TRIAMCINOLONE ACETONIDE SCH: 100000; 1 OINTMENT TOPICAL at 12:41

## 2017-09-20 RX ADMIN — INSULIN LISPRO SCH UNITS: 100 INJECTION, SUSPENSION SUBCUTANEOUS at 17:04

## 2017-09-20 RX ADMIN — Medication SCH MG: at 17:05

## 2017-09-20 RX ADMIN — BISMUTH SUBSALICYLATE PRN MG: 525 SUSPENSION ORAL at 01:47

## 2017-09-21 RX ADMIN — Medication SCH CAP: at 16:37

## 2017-09-21 RX ADMIN — GLIPIZIDE SCH MG: 5 TABLET, EXTENDED RELEASE ORAL at 16:39

## 2017-09-21 RX ADMIN — INSULIN LISPRO SCH UNITS: 100 INJECTION, SUSPENSION SUBCUTANEOUS at 16:39

## 2017-09-21 RX ADMIN — OXYCODONE HYDROCHLORIDE SCH MG: 20 TABLET, FILM COATED, EXTENDED RELEASE ORAL at 08:11

## 2017-09-21 RX ADMIN — GLIPIZIDE SCH MG: 5 TABLET, EXTENDED RELEASE ORAL at 11:20

## 2017-09-21 RX ADMIN — Medication SCH MG: at 16:46

## 2017-09-21 RX ADMIN — OXYCODONE HYDROCHLORIDE SCH MG: 20 TABLET, FILM COATED, EXTENDED RELEASE ORAL at 21:49

## 2017-09-21 RX ADMIN — PANTOPRAZOLE SODIUM SCH MG: 40 TABLET, DELAYED RELEASE ORAL at 08:15

## 2017-09-21 RX ADMIN — NYSTATIN AND TRIAMCINOLONE ACETONIDE SCH APPLIC: 100000; 1 OINTMENT TOPICAL at 08:15

## 2017-09-21 RX ADMIN — Medication SCH MG: at 08:15

## 2017-09-21 RX ADMIN — INSULIN LISPRO SCH UNITS: 100 INJECTION, SUSPENSION SUBCUTANEOUS at 08:16

## 2017-09-21 RX ADMIN — VITAMIN A AND VITAMIN D SCH EA: 929.3 OINTMENT TOPICAL at 08:15

## 2017-09-21 RX ADMIN — VITAMIN A AND VITAMIN D SCH EA: 929.3 OINTMENT TOPICAL at 16:45

## 2017-09-21 RX ADMIN — Medication SCH CAP: at 08:11

## 2017-09-22 VITALS — RESPIRATION RATE: 20 BRPM

## 2017-09-22 RX ADMIN — MORPHINE SULFATE SCH MG: 30 TABLET, EXTENDED RELEASE ORAL at 21:23

## 2017-09-22 RX ADMIN — GLIPIZIDE SCH MG: 5 TABLET, EXTENDED RELEASE ORAL at 08:40

## 2017-09-22 RX ADMIN — Medication SCH CAP: at 17:10

## 2017-09-22 RX ADMIN — VITAMIN A AND VITAMIN D SCH EA: 929.3 OINTMENT TOPICAL at 17:08

## 2017-09-22 RX ADMIN — Medication SCH CAP: at 08:43

## 2017-09-22 RX ADMIN — INSULIN LISPRO SCH UNITS: 100 INJECTION, SUSPENSION SUBCUTANEOUS at 08:41

## 2017-09-22 RX ADMIN — Medication SCH MG: at 17:05

## 2017-09-22 RX ADMIN — INSULIN LISPRO SCH UNITS: 100 INJECTION, SUSPENSION SUBCUTANEOUS at 17:05

## 2017-09-22 RX ADMIN — OXYCODONE HYDROCHLORIDE SCH MG: 20 TABLET, FILM COATED, EXTENDED RELEASE ORAL at 08:43

## 2017-09-22 RX ADMIN — Medication PRN MG: at 17:14

## 2017-09-22 RX ADMIN — PANTOPRAZOLE SODIUM SCH MG: 40 TABLET, DELAYED RELEASE ORAL at 08:41

## 2017-09-22 RX ADMIN — VITAMIN A AND VITAMIN D SCH EA: 929.3 OINTMENT TOPICAL at 08:41

## 2017-09-22 RX ADMIN — Medication SCH MG: at 08:40

## 2017-09-22 RX ADMIN — GLIPIZIDE SCH MG: 5 TABLET, EXTENDED RELEASE ORAL at 17:05

## 2017-09-23 RX ADMIN — GLIPIZIDE SCH MG: 5 TABLET, EXTENDED RELEASE ORAL at 08:57

## 2017-09-23 RX ADMIN — INSULIN LISPRO SCH UNITS: 100 INJECTION, SUSPENSION SUBCUTANEOUS at 17:33

## 2017-09-23 RX ADMIN — VITAMIN A AND VITAMIN D SCH EA: 929.3 OINTMENT TOPICAL at 08:47

## 2017-09-23 RX ADMIN — INSULIN LISPRO SCH UNITS: 100 INJECTION, SUSPENSION SUBCUTANEOUS at 09:02

## 2017-09-23 RX ADMIN — Medication SCH CAP: at 17:31

## 2017-09-23 RX ADMIN — MORPHINE SULFATE SCH MG: 30 TABLET, EXTENDED RELEASE ORAL at 20:34

## 2017-09-23 RX ADMIN — PANTOPRAZOLE SODIUM SCH MG: 40 TABLET, DELAYED RELEASE ORAL at 08:46

## 2017-09-23 RX ADMIN — Medication SCH MG: at 08:46

## 2017-09-23 RX ADMIN — Medication SCH CAP: at 08:45

## 2017-09-23 RX ADMIN — Medication SCH MG: at 17:33

## 2017-09-23 RX ADMIN — MORPHINE SULFATE SCH MG: 30 TABLET, EXTENDED RELEASE ORAL at 08:53

## 2017-09-23 RX ADMIN — VITAMIN A AND VITAMIN D SCH EA: 929.3 OINTMENT TOPICAL at 17:31

## 2017-09-23 RX ADMIN — GLIPIZIDE SCH MG: 5 TABLET, EXTENDED RELEASE ORAL at 17:33

## 2017-09-24 RX ADMIN — Medication SCH CAP: at 08:27

## 2017-09-24 RX ADMIN — INSULIN LISPRO SCH UNITS: 100 INJECTION, SUSPENSION SUBCUTANEOUS at 08:29

## 2017-09-24 RX ADMIN — GLIPIZIDE SCH MG: 5 TABLET, EXTENDED RELEASE ORAL at 17:18

## 2017-09-24 RX ADMIN — PANTOPRAZOLE SODIUM SCH MG: 40 TABLET, DELAYED RELEASE ORAL at 08:28

## 2017-09-24 RX ADMIN — INSULIN LISPRO SCH UNITS: 100 INJECTION, SUSPENSION SUBCUTANEOUS at 17:18

## 2017-09-24 RX ADMIN — MORPHINE SULFATE SCH MG: 30 TABLET, EXTENDED RELEASE ORAL at 08:27

## 2017-09-24 RX ADMIN — GLIPIZIDE SCH MG: 5 TABLET, EXTENDED RELEASE ORAL at 08:28

## 2017-09-24 RX ADMIN — Medication SCH MG: at 08:28

## 2017-09-24 RX ADMIN — VITAMIN A AND VITAMIN D SCH EA: 929.3 OINTMENT TOPICAL at 08:29

## 2017-09-24 RX ADMIN — Medication PRN MG: at 02:47

## 2017-09-24 RX ADMIN — MORPHINE SULFATE SCH MG: 30 TABLET, EXTENDED RELEASE ORAL at 20:24

## 2017-09-24 RX ADMIN — VITAMIN A AND VITAMIN D SCH EA: 929.3 OINTMENT TOPICAL at 17:24

## 2017-09-25 VITALS — OXYGEN SATURATION: 95 % | DIASTOLIC BLOOD PRESSURE: 52 MMHG | HEART RATE: 85 BPM | SYSTOLIC BLOOD PRESSURE: 136 MMHG

## 2017-09-25 VITALS — TEMPERATURE: 98.6 F

## 2017-09-25 PROCEDURE — 0HBRXZZ EXCISION OF TOE NAIL, EXTERNAL APPROACH: ICD-10-PCS

## 2017-09-25 PROCEDURE — 3E0234Z INTRODUCTION OF SERUM, TOXOID AND VACCINE INTO MUSCLE, PERCUTANEOUS APPROACH: ICD-10-PCS | Performed by: FAMILY MEDICINE

## 2017-09-25 RX ADMIN — GLIPIZIDE SCH MG: 5 TABLET, EXTENDED RELEASE ORAL at 08:15

## 2017-09-25 RX ADMIN — VITAMIN A AND VITAMIN D SCH EA: 929.3 OINTMENT TOPICAL at 08:16

## 2017-09-25 RX ADMIN — MORPHINE SULFATE SCH MG: 30 TABLET, EXTENDED RELEASE ORAL at 08:14

## 2017-09-25 RX ADMIN — Medication PRN MG: at 05:10

## 2017-09-25 RX ADMIN — Medication PRN MG: at 12:28

## 2017-09-25 RX ADMIN — PANTOPRAZOLE SODIUM SCH MG: 40 TABLET, DELAYED RELEASE ORAL at 08:16

## 2017-09-25 RX ADMIN — INSULIN LISPRO SCH UNITS: 100 INJECTION, SUSPENSION SUBCUTANEOUS at 08:16

## 2017-10-11 ENCOUNTER — HOSPITAL ENCOUNTER (INPATIENT)
Dept: HOSPITAL 14 - H.ER | Age: 57
LOS: 6 days | Discharge: NURSING, REHAB OR LONG-TERM CARE | DRG: 100 | End: 2017-10-17
Attending: FAMILY MEDICINE | Admitting: FAMILY MEDICINE
Payer: MEDICARE

## 2017-10-11 VITALS — BODY MASS INDEX: 38.7 KG/M2

## 2017-10-11 DIAGNOSIS — E11.42: ICD-10-CM

## 2017-10-11 DIAGNOSIS — Z79.82: ICD-10-CM

## 2017-10-11 DIAGNOSIS — I16.1: ICD-10-CM

## 2017-10-11 DIAGNOSIS — G40.89: Primary | ICD-10-CM

## 2017-10-11 DIAGNOSIS — E11.51: ICD-10-CM

## 2017-10-11 DIAGNOSIS — F41.9: ICD-10-CM

## 2017-10-11 DIAGNOSIS — J45.909: ICD-10-CM

## 2017-10-11 DIAGNOSIS — M19.90: ICD-10-CM

## 2017-10-11 DIAGNOSIS — I16.0: ICD-10-CM

## 2017-10-11 DIAGNOSIS — K21.9: ICD-10-CM

## 2017-10-11 DIAGNOSIS — Z89.511: ICD-10-CM

## 2017-10-11 DIAGNOSIS — D50.9: ICD-10-CM

## 2017-10-11 DIAGNOSIS — I65.22: ICD-10-CM

## 2017-10-11 DIAGNOSIS — I67.83: ICD-10-CM

## 2017-10-11 DIAGNOSIS — E78.5: ICD-10-CM

## 2017-10-11 DIAGNOSIS — Z88.1: ICD-10-CM

## 2017-10-11 DIAGNOSIS — G89.29: ICD-10-CM

## 2017-10-11 DIAGNOSIS — Z79.4: ICD-10-CM

## 2017-10-11 LAB
ALBUMIN/GLOB SERPL: 1 {RATIO} (ref 1–2.1)
ALP SERPL-CCNC: 383 U/L (ref 38–126)
ALT SERPL-CCNC: 50 U/L (ref 9–52)
APTT BLD: 38.9 SECONDS (ref 25.6–37.1)
AST SERPL-CCNC: 59 U/L (ref 14–36)
BACTERIA #/AREA URNS HPF: (no result) /[HPF]
BASOPHILS # BLD AUTO: 0.1 K/UL (ref 0–0.2)
BASOPHILS NFR BLD: 0.7 % (ref 0–2)
BILIRUB SERPL-MCNC: 0.6 MG/DL (ref 0.2–1.3)
BILIRUB UR-MCNC: NEGATIVE MG/DL
BUN SERPL-MCNC: 14 MG/DL (ref 7–17)
CALCIUM SERPL-MCNC: 8.9 MG/DL (ref 8.4–10.2)
CHLORIDE SERPL-SCNC: 102 MMOL/L (ref 98–107)
CHOLEST SERPL-MCNC: 182 MG/DL (ref 0–199)
CO2 SERPL-SCNC: 23 MMOL/L (ref 22–30)
COLOR UR: YELLOW
EOSINOPHIL # BLD AUTO: 0.7 K/UL (ref 0–0.7)
EOSINOPHIL NFR BLD: 6.9 % (ref 0–4)
ERYTHROCYTE [DISTWIDTH] IN BLOOD BY AUTOMATED COUNT: 18.3 % (ref 11.5–14.5)
GLOBULIN SER-MCNC: 4.1 GM/DL (ref 2.2–3.9)
GLUCOSE SERPL-MCNC: 359 MG/DL (ref 65–105)
GLUCOSE UR STRIP-MCNC: >=500 MG/DL
HCT VFR BLD CALC: 45 % (ref 34–47)
KETONES UR STRIP-MCNC: NEGATIVE MG/DL
LEUKOCYTE ESTERASE UR-ACNC: (no result) LEU/UL
LYMPHOCYTES # BLD AUTO: 1.7 K/UL (ref 1–4.3)
LYMPHOCYTES NFR BLD AUTO: 17.3 % (ref 20–40)
MCH RBC QN AUTO: 29.9 PG (ref 27–31)
MCHC RBC AUTO-ENTMCNC: 32.2 G/DL (ref 33–37)
MCV RBC AUTO: 92.8 FL (ref 81–99)
MONOCYTES # BLD: 0.6 K/UL (ref 0–0.8)
MONOCYTES NFR BLD: 6.3 % (ref 0–10)
NEUTROPHILS # BLD: 6.6 K/UL (ref 1.8–7)
NEUTROPHILS NFR BLD AUTO: 68.8 % (ref 50–75)
NRBC BLD AUTO-RTO: 0.1 % (ref 0–0)
PH UR STRIP: 7 [PH] (ref 5–8)
PLATELET # BLD: 210 K/UL (ref 130–400)
PMV BLD AUTO: 11.6 FL (ref 7.2–11.7)
POTASSIUM SERPL-SCNC: 3.7 MMOL/L (ref 3.6–5)
PROT SERPL-MCNC: 8.2 G/DL (ref 6.3–8.2)
PROT UR STRIP-MCNC: >=500 MG/DL
RBC # UR STRIP: (no result) /UL
RBC #/AREA URNS HPF: 11 /HPF (ref 0–3)
SODIUM SERPL-SCNC: 141 MMOL/L (ref 132–148)
SP GR UR STRIP: 1.02 (ref 1–1.03)
TROPONIN I SERPL-MCNC: 0.03 NG/ML (ref 0–0.12)
UROBILINOGEN UR-MCNC: (no result) MG/DL (ref 0.2–1)
WBC # BLD AUTO: 9.6 K/UL (ref 4.8–10.8)
WBC #/AREA URNS HPF: 10 /HPF (ref 0–5)

## 2017-10-11 RX ADMIN — INSULIN DETEMIR SCH UNITS: 100 INJECTION, SOLUTION SUBCUTANEOUS at 22:58

## 2017-10-11 RX ADMIN — INSULIN LISPRO SCH UNITS: 100 INJECTION, SUSPENSION SUBCUTANEOUS at 22:59

## 2017-10-12 RX ADMIN — INSULIN DETEMIR SCH: 100 INJECTION, SOLUTION SUBCUTANEOUS at 21:41

## 2017-10-12 RX ADMIN — GLIPIZIDE SCH MG: 5 TABLET, EXTENDED RELEASE ORAL at 17:37

## 2017-10-12 RX ADMIN — PANTOPRAZOLE SODIUM SCH MG: 40 TABLET, DELAYED RELEASE ORAL at 10:05

## 2017-10-12 RX ADMIN — GLIPIZIDE SCH MG: 5 TABLET, EXTENDED RELEASE ORAL at 10:03

## 2017-10-12 RX ADMIN — ENOXAPARIN SODIUM SCH MG: 40 INJECTION SUBCUTANEOUS at 10:05

## 2017-10-12 RX ADMIN — INSULIN LISPRO SCH: 100 INJECTION, SUSPENSION SUBCUTANEOUS at 21:30

## 2017-10-12 RX ADMIN — INSULIN LISPRO SCH: 100 INJECTION, SUSPENSION SUBCUTANEOUS at 10:09

## 2017-10-12 RX ADMIN — MORPHINE SULFATE SCH MG: 15 TABLET, FILM COATED, EXTENDED RELEASE ORAL at 12:58

## 2017-10-12 RX ADMIN — MORPHINE SULFATE SCH MG: 15 TABLET, FILM COATED, EXTENDED RELEASE ORAL at 21:13

## 2017-10-13 RX ADMIN — GLIPIZIDE SCH MG: 5 TABLET, EXTENDED RELEASE ORAL at 08:54

## 2017-10-13 RX ADMIN — ENOXAPARIN SODIUM SCH MG: 40 INJECTION SUBCUTANEOUS at 08:54

## 2017-10-13 RX ADMIN — PANTOPRAZOLE SODIUM SCH MG: 40 TABLET, DELAYED RELEASE ORAL at 08:54

## 2017-10-13 RX ADMIN — MORPHINE SULFATE SCH MG: 15 TABLET, FILM COATED, EXTENDED RELEASE ORAL at 21:17

## 2017-10-13 RX ADMIN — INSULIN LISPRO SCH UNITS: 100 INJECTION, SUSPENSION SUBCUTANEOUS at 21:14

## 2017-10-13 RX ADMIN — GLIPIZIDE SCH MG: 5 TABLET, EXTENDED RELEASE ORAL at 17:33

## 2017-10-13 RX ADMIN — INSULIN LISPRO SCH UNITS: 100 INJECTION, SUSPENSION SUBCUTANEOUS at 08:56

## 2017-10-13 RX ADMIN — INSULIN DETEMIR SCH UNITS: 100 INJECTION, SOLUTION SUBCUTANEOUS at 21:15

## 2017-10-13 RX ADMIN — MORPHINE SULFATE SCH MG: 15 TABLET, FILM COATED, EXTENDED RELEASE ORAL at 08:52

## 2017-10-14 RX ADMIN — PANTOPRAZOLE SODIUM SCH MG: 40 TABLET, DELAYED RELEASE ORAL at 09:19

## 2017-10-14 RX ADMIN — MORPHINE SULFATE SCH MG: 15 TABLET, FILM COATED, EXTENDED RELEASE ORAL at 09:21

## 2017-10-14 RX ADMIN — BUTALBITAL, ACETAMINOPHEN, AND CAFFEINE PRN TAB: 50; 325; 40 TABLET ORAL at 17:24

## 2017-10-14 RX ADMIN — GLIPIZIDE SCH MG: 5 TABLET, EXTENDED RELEASE ORAL at 17:25

## 2017-10-14 RX ADMIN — INSULIN LISPRO SCH UNITS: 100 INJECTION, SUSPENSION SUBCUTANEOUS at 21:00

## 2017-10-14 RX ADMIN — ENOXAPARIN SODIUM SCH MG: 40 INJECTION SUBCUTANEOUS at 09:14

## 2017-10-14 RX ADMIN — GLIPIZIDE SCH MG: 5 TABLET, EXTENDED RELEASE ORAL at 09:15

## 2017-10-14 RX ADMIN — MORPHINE SULFATE SCH MG: 15 TABLET, FILM COATED, EXTENDED RELEASE ORAL at 21:19

## 2017-10-14 RX ADMIN — INSULIN DETEMIR SCH UNITS: 100 INJECTION, SOLUTION SUBCUTANEOUS at 22:44

## 2017-10-14 RX ADMIN — INSULIN LISPRO SCH UNITS: 100 INJECTION, SUSPENSION SUBCUTANEOUS at 09:15

## 2017-10-15 RX ADMIN — INSULIN LISPRO SCH UNITS: 100 INJECTION, SUSPENSION SUBCUTANEOUS at 09:13

## 2017-10-15 RX ADMIN — BUTALBITAL, ACETAMINOPHEN, AND CAFFEINE PRN TAB: 50; 325; 40 TABLET ORAL at 09:05

## 2017-10-15 RX ADMIN — GLIPIZIDE SCH MG: 5 TABLET, EXTENDED RELEASE ORAL at 16:57

## 2017-10-15 RX ADMIN — GLIPIZIDE SCH MG: 5 TABLET, EXTENDED RELEASE ORAL at 09:04

## 2017-10-15 RX ADMIN — MORPHINE SULFATE SCH MG: 15 TABLET, FILM COATED, EXTENDED RELEASE ORAL at 09:10

## 2017-10-15 RX ADMIN — INSULIN LISPRO SCH UNITS: 100 INJECTION, SUSPENSION SUBCUTANEOUS at 21:52

## 2017-10-15 RX ADMIN — INSULIN LISPRO SCH: 100 INJECTION, SUSPENSION SUBCUTANEOUS at 21:59

## 2017-10-15 RX ADMIN — BUTALBITAL, ACETAMINOPHEN, AND CAFFEINE PRN TAB: 50; 325; 40 TABLET ORAL at 17:05

## 2017-10-15 RX ADMIN — PANTOPRAZOLE SODIUM SCH MG: 40 TABLET, DELAYED RELEASE ORAL at 09:06

## 2017-10-15 RX ADMIN — INSULIN DETEMIR SCH UNITS: 100 INJECTION, SOLUTION SUBCUTANEOUS at 21:56

## 2017-10-15 RX ADMIN — ENOXAPARIN SODIUM SCH MG: 40 INJECTION SUBCUTANEOUS at 09:06

## 2017-10-16 ENCOUNTER — HOSPITAL ENCOUNTER (INPATIENT)
Dept: HOSPITAL 14 - H.TCU | Age: 57
LOS: 7 days | Discharge: HOME | DRG: 101 | End: 2017-10-23
Attending: FAMILY MEDICINE | Admitting: FAMILY MEDICINE
Payer: COMMERCIAL

## 2017-10-16 VITALS — BODY MASS INDEX: 30.3 KG/M2

## 2017-10-16 VITALS
HEART RATE: 69 BPM | OXYGEN SATURATION: 96 % | TEMPERATURE: 97.4 F | DIASTOLIC BLOOD PRESSURE: 51 MMHG | SYSTOLIC BLOOD PRESSURE: 105 MMHG

## 2017-10-16 VITALS — RESPIRATION RATE: 20 BRPM

## 2017-10-16 DIAGNOSIS — F32.9: ICD-10-CM

## 2017-10-16 DIAGNOSIS — I11.0: ICD-10-CM

## 2017-10-16 DIAGNOSIS — D64.9: ICD-10-CM

## 2017-10-16 DIAGNOSIS — E11.42: ICD-10-CM

## 2017-10-16 DIAGNOSIS — R11.2: ICD-10-CM

## 2017-10-16 DIAGNOSIS — I50.9: ICD-10-CM

## 2017-10-16 DIAGNOSIS — L85.3: ICD-10-CM

## 2017-10-16 DIAGNOSIS — Z79.899: ICD-10-CM

## 2017-10-16 DIAGNOSIS — F17.210: ICD-10-CM

## 2017-10-16 DIAGNOSIS — E11.51: ICD-10-CM

## 2017-10-16 DIAGNOSIS — G40.89: Primary | ICD-10-CM

## 2017-10-16 DIAGNOSIS — J45.909: ICD-10-CM

## 2017-10-16 DIAGNOSIS — Z89.511: ICD-10-CM

## 2017-10-16 DIAGNOSIS — E78.5: ICD-10-CM

## 2017-10-16 DIAGNOSIS — Z91.19: ICD-10-CM

## 2017-10-16 DIAGNOSIS — R53.1: ICD-10-CM

## 2017-10-16 DIAGNOSIS — G44.209: ICD-10-CM

## 2017-10-16 DIAGNOSIS — I16.1: ICD-10-CM

## 2017-10-16 DIAGNOSIS — Z90.49: ICD-10-CM

## 2017-10-16 DIAGNOSIS — K21.9: ICD-10-CM

## 2017-10-16 DIAGNOSIS — F41.9: ICD-10-CM

## 2017-10-16 DIAGNOSIS — L84: ICD-10-CM

## 2017-10-16 DIAGNOSIS — E78.00: ICD-10-CM

## 2017-10-16 PROCEDURE — F07M6FZ THERAPEUTIC EXERCISE TREATMENT OF MUSCULOSKELETAL SYSTEM - WHOLE BODY USING ASSISTIVE, ADAPTIVE, SUPPORTIVE OR PROTECTIVE EQUIPMENT: ICD-10-PCS | Performed by: FAMILY MEDICINE

## 2017-10-16 PROCEDURE — F08Z4FZ HOME MANAGEMENT TREATMENT USING ASSISTIVE, ADAPTIVE, SUPPORTIVE OR PROTECTIVE EQUIPMENT: ICD-10-PCS | Performed by: FAMILY MEDICINE

## 2017-10-16 RX ADMIN — GLIPIZIDE SCH MG: 5 TABLET, EXTENDED RELEASE ORAL at 08:59

## 2017-10-16 RX ADMIN — INSULIN LISPRO SCH UNITS: 100 INJECTION, SUSPENSION SUBCUTANEOUS at 08:59

## 2017-10-16 RX ADMIN — INSULIN LISPRO SCH UNITS: 100 INJECTION, SUSPENSION SUBCUTANEOUS at 21:21

## 2017-10-16 RX ADMIN — BUTALBITAL, ACETAMINOPHEN, AND CAFFEINE PRN TAB: 50; 325; 40 TABLET ORAL at 08:58

## 2017-10-16 RX ADMIN — PANTOPRAZOLE SODIUM SCH MG: 40 TABLET, DELAYED RELEASE ORAL at 09:00

## 2017-10-16 RX ADMIN — GLIPIZIDE SCH MG: 5 TABLET, EXTENDED RELEASE ORAL at 17:36

## 2017-10-16 RX ADMIN — INSULIN DETEMIR SCH UNITS: 100 INJECTION, SOLUTION SUBCUTANEOUS at 21:21

## 2017-10-17 VITALS — RESPIRATION RATE: 20 BRPM

## 2017-10-17 LAB
BASOPHILS # BLD AUTO: 0.1 K/UL (ref 0–0.2)
BASOPHILS NFR BLD: 0.9 % (ref 0–2)
BUN SERPL-MCNC: 15 MG/DL (ref 7–17)
CALCIUM SERPL-MCNC: 9.7 MG/DL (ref 8.4–10.2)
CHLORIDE SERPL-SCNC: 100 MMOL/L (ref 98–107)
CO2 SERPL-SCNC: 26 MMOL/L (ref 22–30)
EOSINOPHIL # BLD AUTO: 0.3 K/UL (ref 0–0.7)
EOSINOPHIL NFR BLD: 4.5 % (ref 0–4)
ERYTHROCYTE [DISTWIDTH] IN BLOOD BY AUTOMATED COUNT: 16.7 % (ref 11.5–14.5)
GLUCOSE SERPL-MCNC: 264 MG/DL (ref 65–105)
HCT VFR BLD CALC: 43.5 % (ref 34–47)
LYMPHOCYTES # BLD AUTO: 1.3 K/UL (ref 1–4.3)
LYMPHOCYTES NFR BLD AUTO: 18.5 % (ref 20–40)
MCH RBC QN AUTO: 30.1 PG (ref 27–31)
MCHC RBC AUTO-ENTMCNC: 33.9 G/DL (ref 33–37)
MCV RBC AUTO: 88.9 FL (ref 81–99)
MONOCYTES # BLD: 0.5 K/UL (ref 0–0.8)
MONOCYTES NFR BLD: 7.5 % (ref 0–10)
NEUTROPHILS # BLD: 4.9 K/UL (ref 1.8–7)
NEUTROPHILS NFR BLD AUTO: 68.6 % (ref 50–75)
NRBC BLD AUTO-RTO: 0.1 % (ref 0–0)
PLATELET # BLD: 150 K/UL (ref 130–400)
PMV BLD AUTO: 10.4 FL (ref 7.2–11.7)
POTASSIUM SERPL-SCNC: 3.8 MMOL/L (ref 3.6–5)
SODIUM SERPL-SCNC: 137 MMOL/L (ref 132–148)
WBC # BLD AUTO: 7.1 K/UL (ref 4.8–10.8)

## 2017-10-17 RX ADMIN — Medication SCH MG: at 21:47

## 2017-10-17 RX ADMIN — PANTOPRAZOLE SODIUM SCH MG: 40 TABLET, DELAYED RELEASE ORAL at 08:33

## 2017-10-17 RX ADMIN — INSULIN LISPRO SCH UNITS: 100 INJECTION, SUSPENSION SUBCUTANEOUS at 08:34

## 2017-10-17 RX ADMIN — VALPROATE SODIUM SCH MLS/HR: 100 INJECTION INTRAVENOUS at 15:35

## 2017-10-17 RX ADMIN — GLIPIZIDE SCH MG: 5 TABLET, EXTENDED RELEASE ORAL at 08:33

## 2017-10-17 RX ADMIN — INSULIN LISPRO SCH: 100 INJECTION, SUSPENSION SUBCUTANEOUS at 21:39

## 2017-10-17 RX ADMIN — INSULIN DETEMIR SCH UNITS: 100 INJECTION, SOLUTION SUBCUTANEOUS at 21:41

## 2017-10-17 RX ADMIN — Medication SCH MG: at 13:48

## 2017-10-17 RX ADMIN — GLIPIZIDE SCH MG: 5 TABLET, EXTENDED RELEASE ORAL at 16:44

## 2017-10-17 RX ADMIN — VALPROATE SODIUM SCH MLS/HR: 100 INJECTION INTRAVENOUS at 21:30

## 2017-10-18 RX ADMIN — PANTOPRAZOLE SODIUM SCH MG: 40 TABLET, DELAYED RELEASE ORAL at 08:33

## 2017-10-18 RX ADMIN — GLIPIZIDE SCH MG: 5 TABLET, EXTENDED RELEASE ORAL at 08:32

## 2017-10-18 RX ADMIN — VALPROATE SODIUM SCH MLS/HR: 100 INJECTION INTRAVENOUS at 08:30

## 2017-10-18 RX ADMIN — INSULIN LISPRO SCH UNITS: 100 INJECTION, SUSPENSION SUBCUTANEOUS at 16:41

## 2017-10-18 RX ADMIN — Medication SCH MG: at 21:09

## 2017-10-18 RX ADMIN — INSULIN DETEMIR SCH UNITS: 100 INJECTION, SOLUTION SUBCUTANEOUS at 21:09

## 2017-10-18 RX ADMIN — Medication SCH MG: at 08:31

## 2017-10-18 RX ADMIN — GLIPIZIDE SCH MG: 5 TABLET, EXTENDED RELEASE ORAL at 16:40

## 2017-10-18 RX ADMIN — VALPROATE SODIUM SCH MLS/HR: 100 INJECTION INTRAVENOUS at 21:07

## 2017-10-19 PROCEDURE — 0HDNXZZ EXTRACTION OF LEFT FOOT SKIN, EXTERNAL APPROACH: ICD-10-PCS

## 2017-10-19 RX ADMIN — GLIPIZIDE SCH MG: 5 TABLET, EXTENDED RELEASE ORAL at 17:00

## 2017-10-19 RX ADMIN — DIVALPROEX SODIUM SCH MG: 250 TABLET, DELAYED RELEASE ORAL at 16:52

## 2017-10-19 RX ADMIN — PANTOPRAZOLE SODIUM SCH MG: 40 TABLET, DELAYED RELEASE ORAL at 08:08

## 2017-10-19 RX ADMIN — INSULIN LISPRO SCH UNITS: 100 INJECTION, SUSPENSION SUBCUTANEOUS at 08:03

## 2017-10-19 RX ADMIN — INSULIN LISPRO SCH UNITS: 100 INJECTION, SUSPENSION SUBCUTANEOUS at 16:50

## 2017-10-19 RX ADMIN — VALPROATE SODIUM SCH MLS/HR: 100 INJECTION INTRAVENOUS at 10:43

## 2017-10-19 RX ADMIN — Medication SCH MG: at 08:08

## 2017-10-19 RX ADMIN — INSULIN DETEMIR SCH UNITS: 100 INJECTION, SOLUTION SUBCUTANEOUS at 21:30

## 2017-10-19 RX ADMIN — GLIPIZIDE SCH MG: 5 TABLET, EXTENDED RELEASE ORAL at 08:08

## 2017-10-19 RX ADMIN — Medication SCH MG: at 22:30

## 2017-10-20 RX ADMIN — INSULIN LISPRO SCH UNITS: 100 INJECTION, SUSPENSION SUBCUTANEOUS at 09:30

## 2017-10-20 RX ADMIN — Medication SCH MG: at 10:09

## 2017-10-20 RX ADMIN — INSULIN LISPRO SCH UNITS: 100 INJECTION, SUSPENSION SUBCUTANEOUS at 16:55

## 2017-10-20 RX ADMIN — Medication SCH MG: at 21:18

## 2017-10-20 RX ADMIN — GLIPIZIDE SCH MG: 5 TABLET, EXTENDED RELEASE ORAL at 16:56

## 2017-10-20 RX ADMIN — INSULIN DETEMIR SCH UNITS: 100 INJECTION, SOLUTION SUBCUTANEOUS at 21:19

## 2017-10-20 RX ADMIN — DIVALPROEX SODIUM SCH MG: 250 TABLET, DELAYED RELEASE ORAL at 16:55

## 2017-10-20 RX ADMIN — DIVALPROEX SODIUM SCH MG: 250 TABLET, DELAYED RELEASE ORAL at 10:09

## 2017-10-20 RX ADMIN — PANTOPRAZOLE SODIUM SCH MG: 40 TABLET, DELAYED RELEASE ORAL at 10:09

## 2017-10-20 RX ADMIN — GLIPIZIDE SCH MG: 5 TABLET, EXTENDED RELEASE ORAL at 10:07

## 2017-10-21 RX ADMIN — INSULIN DETEMIR SCH UNITS: 100 INJECTION, SOLUTION SUBCUTANEOUS at 21:15

## 2017-10-21 RX ADMIN — Medication SCH MG: at 21:17

## 2017-10-21 RX ADMIN — GLIPIZIDE SCH MG: 5 TABLET, EXTENDED RELEASE ORAL at 17:01

## 2017-10-21 RX ADMIN — INSULIN LISPRO SCH UNITS: 100 INJECTION, SUSPENSION SUBCUTANEOUS at 07:06

## 2017-10-21 RX ADMIN — GLIPIZIDE SCH MG: 5 TABLET, EXTENDED RELEASE ORAL at 09:32

## 2017-10-21 RX ADMIN — INSULIN LISPRO SCH UNITS: 100 INJECTION, SUSPENSION SUBCUTANEOUS at 17:01

## 2017-10-21 RX ADMIN — DIVALPROEX SODIUM SCH MG: 250 TABLET, DELAYED RELEASE ORAL at 17:00

## 2017-10-21 RX ADMIN — PANTOPRAZOLE SODIUM SCH MG: 40 TABLET, DELAYED RELEASE ORAL at 09:30

## 2017-10-21 RX ADMIN — AMMONIUM LACTATE SCH U: 12 CREAM TOPICAL at 16:59

## 2017-10-21 RX ADMIN — DIVALPROEX SODIUM SCH MG: 250 TABLET, DELAYED RELEASE ORAL at 09:30

## 2017-10-22 VITALS — TEMPERATURE: 97.7 F

## 2017-10-22 VITALS — OXYGEN SATURATION: 98 %

## 2017-10-22 RX ADMIN — GLIPIZIDE SCH MG: 5 TABLET, EXTENDED RELEASE ORAL at 08:18

## 2017-10-22 RX ADMIN — PANTOPRAZOLE SODIUM SCH MG: 40 TABLET, DELAYED RELEASE ORAL at 08:18

## 2017-10-22 RX ADMIN — AMMONIUM LACTATE SCH U: 12 CREAM TOPICAL at 17:20

## 2017-10-22 RX ADMIN — AMMONIUM LACTATE SCH U: 12 CREAM TOPICAL at 08:24

## 2017-10-22 RX ADMIN — INSULIN DETEMIR SCH UNITS: 100 INJECTION, SOLUTION SUBCUTANEOUS at 21:29

## 2017-10-22 RX ADMIN — INSULIN LISPRO SCH UNITS: 100 INJECTION, SUSPENSION SUBCUTANEOUS at 07:13

## 2017-10-22 RX ADMIN — DIVALPROEX SODIUM SCH MG: 250 TABLET, DELAYED RELEASE ORAL at 08:19

## 2017-10-22 RX ADMIN — DIVALPROEX SODIUM SCH MG: 250 TABLET, DELAYED RELEASE ORAL at 17:16

## 2017-10-22 RX ADMIN — INSULIN LISPRO SCH UNITS: 100 INJECTION, SUSPENSION SUBCUTANEOUS at 17:17

## 2017-10-22 RX ADMIN — GLIPIZIDE SCH MG: 5 TABLET, EXTENDED RELEASE ORAL at 17:17

## 2017-10-22 RX ADMIN — Medication SCH MG: at 21:29

## 2017-10-23 VITALS — SYSTOLIC BLOOD PRESSURE: 114 MMHG | DIASTOLIC BLOOD PRESSURE: 56 MMHG | HEART RATE: 71 BPM

## 2017-10-23 RX ADMIN — AMMONIUM LACTATE SCH U: 12 CREAM TOPICAL at 08:14

## 2017-10-23 RX ADMIN — DIVALPROEX SODIUM SCH MG: 250 TABLET, DELAYED RELEASE ORAL at 08:04

## 2017-10-23 RX ADMIN — PANTOPRAZOLE SODIUM SCH MG: 40 TABLET, DELAYED RELEASE ORAL at 08:04

## 2017-10-23 RX ADMIN — INSULIN LISPRO SCH UNITS: 100 INJECTION, SUSPENSION SUBCUTANEOUS at 08:01

## 2017-10-23 RX ADMIN — GLIPIZIDE SCH MG: 5 TABLET, EXTENDED RELEASE ORAL at 08:04

## 2018-02-21 ENCOUNTER — HOSPITAL ENCOUNTER (INPATIENT)
Dept: HOSPITAL 14 - H.ER | Age: 58
LOS: 5 days | Discharge: HOME | DRG: 917 | End: 2018-02-26
Attending: FAMILY MEDICINE | Admitting: FAMILY MEDICINE
Payer: MEDICARE

## 2018-02-21 VITALS — BODY MASS INDEX: 27.3 KG/M2

## 2018-02-21 DIAGNOSIS — F41.9: ICD-10-CM

## 2018-02-21 DIAGNOSIS — E78.5: ICD-10-CM

## 2018-02-21 DIAGNOSIS — Z89.511: ICD-10-CM

## 2018-02-21 DIAGNOSIS — I70.1: ICD-10-CM

## 2018-02-21 DIAGNOSIS — E78.00: ICD-10-CM

## 2018-02-21 DIAGNOSIS — J32.0: ICD-10-CM

## 2018-02-21 DIAGNOSIS — K21.9: ICD-10-CM

## 2018-02-21 DIAGNOSIS — F33.2: ICD-10-CM
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
## 2018-02-21 DIAGNOSIS — I11.0: ICD-10-CM
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
## 2018-02-21 DIAGNOSIS — Z79.899: ICD-10-CM

## 2018-02-21 DIAGNOSIS — E11.65: ICD-10-CM

## 2018-02-21 DIAGNOSIS — J69.0: ICD-10-CM

## 2018-02-21 DIAGNOSIS — E86.0: ICD-10-CM

## 2018-02-21 DIAGNOSIS — I50.9: ICD-10-CM

## 2018-02-21 DIAGNOSIS — J45.909: ICD-10-CM

## 2018-02-21 DIAGNOSIS — Z90.49: ICD-10-CM

## 2018-02-21 DIAGNOSIS — E83.39: ICD-10-CM

## 2018-02-21 DIAGNOSIS — G89.29: ICD-10-CM

## 2018-02-21 DIAGNOSIS — T40.2X1A: Primary | ICD-10-CM

## 2018-02-21 DIAGNOSIS — N17.9: ICD-10-CM

## 2018-02-21 DIAGNOSIS — E11.51: ICD-10-CM

## 2018-02-21 DIAGNOSIS — M19.90: ICD-10-CM

## 2018-02-21 DIAGNOSIS — G40.909: ICD-10-CM

## 2018-02-21 DIAGNOSIS — J98.11: ICD-10-CM

## 2018-02-21 DIAGNOSIS — Z79.02: ICD-10-CM

## 2018-02-21 DIAGNOSIS — Z79.4: ICD-10-CM

## 2018-02-21 DIAGNOSIS — D64.9: ICD-10-CM

## 2018-02-21 DIAGNOSIS — F06.30: ICD-10-CM

## 2018-02-21 DIAGNOSIS — Z87.891: ICD-10-CM

## 2018-02-21 LAB
ALBUMIN SERPL-MCNC: 3.7 G/DL (ref 3.5–5)
ALBUMIN/GLOB SERPL: 0.8 {RATIO} (ref 1–2.1)
ALT SERPL-CCNC: 49 U/L (ref 9–52)
APTT BLD: 30.1 SECONDS (ref 25.6–37.1)
AST SERPL-CCNC: 65 U/L (ref 14–36)
BACTERIA #/AREA URNS HPF: (no result) /[HPF]
BASE EXCESS BLDV CALC-SCNC: -2.4 MMOL/L (ref 0–2)
BASOPHILS # BLD AUTO: 0.1 K/UL (ref 0–0.2)
BASOPHILS NFR BLD: 0.5 % (ref 0–2)
BILIRUB UR-MCNC: NEGATIVE MG/DL
BNP SERPL-MCNC: 460 PG/ML (ref 0–900)
BUN SERPL-MCNC: 64 MG/DL (ref 7–17)
BUN SERPL-MCNC: 78 MG/DL (ref 7–17)
CALCIUM SERPL-MCNC: 7.8 MG/DL (ref 8.4–10.2)
CALCIUM SERPL-MCNC: 8.7 MG/DL (ref 8.4–10.2)
COLOR UR: YELLOW
EOSINOPHIL # BLD AUTO: 0.3 K/UL (ref 0–0.7)
EOSINOPHIL NFR BLD: 2 % (ref 0–4)
ERYTHROCYTE [DISTWIDTH] IN BLOOD BY AUTOMATED COUNT: 13.2 % (ref 11.5–14.5)
FOLATE SERPL-MCNC: 5.1 NG/ML
GFR NON-AFRICAN AMERICAN: 11
GFR NON-AFRICAN AMERICAN: 26
GLUCOSE UR STRIP-MCNC: (no result) MG/DL
HGB BLD-MCNC: 12.2 G/DL (ref 12–16)
INR PPP: 1 (ref 0.9–1.2)
LEUKOCYTE ESTERASE UR-ACNC: (no result) LEU/UL
LYMPHOCYTES # BLD AUTO: 2.4 K/UL (ref 1–4.3)
LYMPHOCYTES NFR BLD AUTO: 16.6 % (ref 20–40)
MAGNESIUM SERPL-MCNC: 2.3 MG/DL (ref 1.6–2.3)
MCH RBC QN AUTO: 33.4 PG (ref 27–31)
MCHC RBC AUTO-ENTMCNC: 34.4 G/DL (ref 33–37)
MCV RBC AUTO: 97.1 FL (ref 81–99)
MONOCYTES # BLD: 1.7 K/UL (ref 0–0.8)
MONOCYTES NFR BLD: 11.6 % (ref 0–10)
NEUTROPHILS # BLD: 10.3 K/UL (ref 1.8–7)
NEUTROPHILS NFR BLD AUTO: 69.3 % (ref 50–75)
NRBC BLD AUTO-RTO: 0.2 % (ref 0–0)
PCO2 BLDV: 57 MMHG (ref 40–60)
PH BLDV: 7.26 [PH] (ref 7.32–7.43)
PH UR STRIP: 5 [PH] (ref 5–8)
PLATELET # BLD: 247 K/UL (ref 130–400)
PMV BLD AUTO: 10.6 FL (ref 7.2–11.7)
PROT UR STRIP-MCNC: 30 MG/DL
PROTHROMBIN TIME: 11.1 SECONDS (ref 9.8–13.1)
RBC # BLD AUTO: 3.65 MIL/UL (ref 3.8–5.2)
RBC # UR STRIP: (no result) /UL
SP GR UR STRIP: 1.01 (ref 1–1.03)
SQUAMOUS EPITHIAL: 7 /HPF (ref 0–5)
TROPONIN I SERPL-MCNC: 0.03 NG/ML (ref 0–0.12)
URINE CLARITY: (no result)
URINE NITRATE: NEGATIVE
UROBILINOGEN UR-MCNC: (no result) MG/DL (ref 0.2–1)
VENOUS BLOOD FIO2: 21 %
VENOUS BLOOD GAS PO2: 54 MM/HG (ref 30–55)
VIT B12 SERPL-MCNC: 576 PG/ML (ref 239–931)
WBC # BLD AUTO: 14.8 K/UL (ref 4.8–10.8)

## 2018-02-21 RX ADMIN — DEXTROSE AND SODIUM CHLORIDE SCH MLS/HR: 5; 900 INJECTION, SOLUTION INTRAVENOUS at 22:22

## 2018-02-21 RX ADMIN — NALOXONE HYDROCHLORIDE SCH MLS/HR: 1 INJECTION PARENTERAL at 16:06

## 2018-02-22 LAB
BASO STIPL BLD QL SMEAR: SLIGHT
BASOPHILS # BLD AUTO: 0 K/UL (ref 0–0.2)
BASOPHILS NFR BLD: 0.3 % (ref 0–2)
BUN SERPL-MCNC: 38 MG/DL (ref 7–17)
CALCIUM SERPL-MCNC: 8.3 MG/DL (ref 8.4–10.2)
EOSINOPHIL # BLD AUTO: 0.1 K/UL (ref 0–0.7)
EOSINOPHIL NFR BLD: 0.9 % (ref 0–4)
EOSINOPHIL NFR BLD: 4 % (ref 0–7)
ERYTHROCYTE [DISTWIDTH] IN BLOOD BY AUTOMATED COUNT: 13.3 % (ref 11.5–14.5)
GFR NON-AFRICAN AMERICAN: > 60
HGB BLD-MCNC: 11.3 G/DL (ref 12–16)
HYPOCHROMIC: SLIGHT
LG PLATELETS BLD QL SMEAR: PRESENT
LYMPHOCYTE: 4 % (ref 20–50)
LYMPHOCYTES # BLD AUTO: 0.5 K/UL (ref 1–4.3)
LYMPHOCYTES NFR BLD AUTO: 7.4 % (ref 20–40)
MCH RBC QN AUTO: 33.1 PG (ref 27–31)
MCHC RBC AUTO-ENTMCNC: 33.8 G/DL (ref 33–37)
MCV RBC AUTO: 98 FL (ref 81–99)
MONOCYTE: 7 % (ref 0–10)
MONOCYTES # BLD: 0.7 K/UL (ref 0–0.8)
MONOCYTES NFR BLD: 10.4 % (ref 0–10)
NEUTROPHILS # BLD: 5.4 K/UL (ref 1.8–7)
NEUTROPHILS NFR BLD AUTO: 81 % (ref 50–75)
NEUTROPHILS NFR BLD AUTO: 85 % (ref 42–75)
NRBC BLD AUTO-RTO: 0 % (ref 0–0)
PLATELET # BLD EST: (no result) 10*3/UL
PLATELET # BLD: 123 K/UL (ref 130–400)
PMV BLD AUTO: 10.3 FL (ref 7.2–11.7)
RBC # BLD AUTO: 3.41 MIL/UL (ref 3.8–5.2)
TOTAL CELLS COUNTED BLD: 100
TOXIC GRANULES BLD QL SMEAR: PRESENT
WBC # BLD AUTO: 6.7 K/UL (ref 4.8–10.8)

## 2018-02-22 RX ADMIN — DEXTROSE AND SODIUM CHLORIDE SCH MLS/HR: 5; 900 INJECTION, SOLUTION INTRAVENOUS at 04:59

## 2018-02-22 RX ADMIN — NALOXONE HYDROCHLORIDE SCH: 1 INJECTION PARENTERAL at 12:20

## 2018-02-22 RX ADMIN — DIVALPROEX SODIUM SCH MG: 250 TABLET, DELAYED RELEASE ORAL at 16:46

## 2018-02-22 RX ADMIN — DIVALPROEX SODIUM SCH MG: 250 TABLET, DELAYED RELEASE ORAL at 12:19

## 2018-02-22 RX ADMIN — NALOXONE HYDROCHLORIDE SCH MLS/HR: 1 INJECTION PARENTERAL at 02:24

## 2018-02-23 LAB
BUN SERPL-MCNC: 25 MG/DL (ref 7–17)
CALCIUM SERPL-MCNC: 8.7 MG/DL (ref 8.4–10.2)
ERYTHROCYTE [DISTWIDTH] IN BLOOD BY AUTOMATED COUNT: 12.8 % (ref 11.5–14.5)
GFR NON-AFRICAN AMERICAN: > 60
HGB BLD-MCNC: 11 G/DL (ref 12–16)
MCH RBC QN AUTO: 33.6 PG (ref 27–31)
MCHC RBC AUTO-ENTMCNC: 34.5 G/DL (ref 33–37)
MCV RBC AUTO: 97.3 FL (ref 81–99)
PLATELET # BLD: 125 K/UL (ref 130–400)
RBC # BLD AUTO: 3.29 MIL/UL (ref 3.8–5.2)
WBC # BLD AUTO: 3.9 K/UL (ref 4.8–10.8)

## 2018-02-23 RX ADMIN — INSULIN DETEMIR SCH UNITS: 100 INJECTION, SOLUTION SUBCUTANEOUS at 22:55

## 2018-02-23 RX ADMIN — DIVALPROEX SODIUM SCH MG: 250 TABLET, DELAYED RELEASE ORAL at 17:27

## 2018-02-23 RX ADMIN — DIVALPROEX SODIUM SCH MG: 250 TABLET, DELAYED RELEASE ORAL at 08:50

## 2018-02-23 RX ADMIN — INSULIN DETEMIR SCH UNITS: 100 INJECTION, SOLUTION SUBCUTANEOUS at 17:28

## 2018-02-23 RX ADMIN — ENOXAPARIN SODIUM SCH MG: 40 INJECTION SUBCUTANEOUS at 10:45

## 2018-02-24 RX ADMIN — ENOXAPARIN SODIUM SCH MG: 40 INJECTION SUBCUTANEOUS at 08:53

## 2018-02-24 RX ADMIN — DIVALPROEX SODIUM SCH MG: 250 TABLET, DELAYED RELEASE ORAL at 16:17

## 2018-02-24 RX ADMIN — DIVALPROEX SODIUM SCH MG: 250 TABLET, DELAYED RELEASE ORAL at 08:53

## 2018-02-25 LAB
BILIRUB UR-MCNC: NEGATIVE MG/DL
COLOR UR: YELLOW
GLUCOSE UR STRIP-MCNC: >=500 MG/DL
LEUKOCYTE ESTERASE UR-ACNC: (no result) LEU/UL
PH UR STRIP: 6 [PH] (ref 5–8)
PROT UR STRIP-MCNC: 30 MG/DL
RBC # UR STRIP: (no result) /UL
SP GR UR STRIP: 1.02 (ref 1–1.03)
SQUAMOUS EPITHIAL: < 1 /HPF (ref 0–5)
URINE CLARITY: (no result)
URINE NITRATE: NEGATIVE
UROBILINOGEN UR-MCNC: (no result) MG/DL (ref 0.2–1)

## 2018-02-25 RX ADMIN — DIVALPROEX SODIUM SCH MG: 250 TABLET, DELAYED RELEASE ORAL at 09:13

## 2018-02-25 RX ADMIN — ENOXAPARIN SODIUM SCH MG: 40 INJECTION SUBCUTANEOUS at 09:16

## 2018-02-25 RX ADMIN — DIVALPROEX SODIUM SCH MG: 250 TABLET, DELAYED RELEASE ORAL at 17:08

## 2018-02-26 VITALS
TEMPERATURE: 97.8 F | DIASTOLIC BLOOD PRESSURE: 68 MMHG | SYSTOLIC BLOOD PRESSURE: 171 MMHG | OXYGEN SATURATION: 98 % | RESPIRATION RATE: 20 BRPM | HEART RATE: 68 BPM

## 2018-02-26 LAB
BASOPHILS # BLD AUTO: 0 K/UL (ref 0–0.2)
BASOPHILS NFR BLD: 0.6 % (ref 0–2)
BUN SERPL-MCNC: 17 MG/DL (ref 7–17)
CALCIUM SERPL-MCNC: 9.3 MG/DL (ref 8.4–10.2)
EOSINOPHIL # BLD AUTO: 0.2 K/UL (ref 0–0.7)
EOSINOPHIL NFR BLD: 2.8 % (ref 0–4)
ERYTHROCYTE [DISTWIDTH] IN BLOOD BY AUTOMATED COUNT: 12.8 % (ref 11.5–14.5)
GFR NON-AFRICAN AMERICAN: > 60
HGB BLD-MCNC: 12.3 G/DL (ref 12–16)
LYMPHOCYTES # BLD AUTO: 1.5 K/UL (ref 1–4.3)
LYMPHOCYTES NFR BLD AUTO: 24.6 % (ref 20–40)
MCH RBC QN AUTO: 32.8 PG (ref 27–31)
MCHC RBC AUTO-ENTMCNC: 34.1 G/DL (ref 33–37)
MCV RBC AUTO: 96.4 FL (ref 81–99)
MONOCYTES # BLD: 0.7 K/UL (ref 0–0.8)
MONOCYTES NFR BLD: 11.4 % (ref 0–10)
NEUTROPHILS # BLD: 3.6 K/UL (ref 1.8–7)
NEUTROPHILS NFR BLD AUTO: 60.6 % (ref 50–75)
NRBC BLD AUTO-RTO: 0.7 % (ref 0–0)
PLATELET # BLD: 181 K/UL (ref 130–400)
PMV BLD AUTO: 9.8 FL (ref 7.2–11.7)
RBC # BLD AUTO: 3.75 MIL/UL (ref 3.8–5.2)
WBC # BLD AUTO: 6 K/UL (ref 4.8–10.8)

## 2018-02-26 RX ADMIN — DIVALPROEX SODIUM SCH MG: 250 TABLET, DELAYED RELEASE ORAL at 08:45

## 2018-02-26 RX ADMIN — ENOXAPARIN SODIUM SCH MG: 40 INJECTION SUBCUTANEOUS at 08:49

## 2018-04-12 ENCOUNTER — HOSPITAL ENCOUNTER (OUTPATIENT)
Dept: HOSPITAL 42 - SDSVAS | Age: 58
Setting detail: OBSERVATION
LOS: 1 days | Discharge: HOME | End: 2018-04-13
Payer: MEDICARE

## 2018-04-12 VITALS — BODY MASS INDEX: 31.9 KG/M2

## 2018-04-12 DIAGNOSIS — I65.23: Primary | ICD-10-CM

## 2018-04-12 DIAGNOSIS — K21.9: ICD-10-CM

## 2018-04-12 DIAGNOSIS — Z89.511: ICD-10-CM

## 2018-04-12 DIAGNOSIS — F17.210: ICD-10-CM

## 2018-04-12 DIAGNOSIS — E11.51: ICD-10-CM

## 2018-04-12 DIAGNOSIS — I10: ICD-10-CM

## 2018-04-12 DIAGNOSIS — E78.5: ICD-10-CM

## 2018-04-12 LAB
HDLC SERPL-MCNC: 37 MG/DL (ref 29–60)
LDLC SERPL-MCNC: 119 MG/DL (ref 0–129)

## 2018-04-12 PROCEDURE — 96361 HYDRATE IV INFUSION ADD-ON: CPT

## 2018-04-12 PROCEDURE — 80061 LIPID PANEL: CPT

## 2018-04-12 PROCEDURE — 93882 EXTRACRANIAL UNI/LTD STUDY: CPT

## 2018-04-12 PROCEDURE — 85027 COMPLETE CBC AUTOMATED: CPT

## 2018-04-12 PROCEDURE — 80053 COMPREHEN METABOLIC PANEL: CPT

## 2018-04-12 PROCEDURE — 86850 RBC ANTIBODY SCREEN: CPT

## 2018-04-12 PROCEDURE — 87081 CULTURE SCREEN ONLY: CPT

## 2018-04-12 PROCEDURE — 96360 HYDRATION IV INFUSION INIT: CPT

## 2018-04-12 PROCEDURE — 36223 PLACE CATH CAROTID/INOM ART: CPT

## 2018-04-12 PROCEDURE — 37215 TRANSCATH STENT CCA W/EPS: CPT

## 2018-04-12 PROCEDURE — 99152 MOD SED SAME PHYS/QHP 5/>YRS: CPT

## 2018-04-12 PROCEDURE — 86900 BLOOD TYPING SEROLOGIC ABO: CPT

## 2018-04-12 PROCEDURE — 99153 MOD SED SAME PHYS/QHP EA: CPT

## 2018-04-12 PROCEDURE — 36415 COLL VENOUS BLD VENIPUNCTURE: CPT

## 2018-04-12 PROCEDURE — 82948 REAGENT STRIP/BLOOD GLUCOSE: CPT

## 2018-04-12 RX ADMIN — DIVALPROEX SODIUM SCH MG: 250 TABLET, DELAYED RELEASE ORAL at 17:37

## 2018-04-12 RX ADMIN — OXYCODONE HYDROCHLORIDE AND ACETAMINOPHEN PRN TAB: 5; 325 TABLET ORAL at 14:22

## 2018-04-12 RX ADMIN — INSULIN LISPRO SCH UNITS: 100 INJECTION, SOLUTION INTRAVENOUS; SUBCUTANEOUS at 17:11

## 2018-04-12 NOTE — VASCULAR
PROCEDURE:  1. Arch arteriogram and right carotid arteriogram.



2. Left ICA angioplasty and stent placement with filter wire 

protection.



HISTORY:

Previous left carotid endarterectomy. Recurrent proximal left ICA 

stenosis.



PHYSICIAN(S):  MD Yoni Flores MD.



TECHNIQUE:

The relative risks and indications of the procedure were explained to 

the patient  and consent obtained. The patient was on Plavix prior to 

procedure. The patient was placed supine on the arteriogram table and 

the right groin prepped and draped in usual sterile fashion. 

Conscious sedation monitoring were provided throughout the procedure 

by a nurse. Via a right common femoral artery approach, a 5 Danish 

sheath was placed. Through the sheath and over a guidewire 5 Danish 

flush catheter was placed in the ascending aorta and an ANNEL DSA arch 

arteriogram performed. The catheter was exchanged for a 5 Danish 

Salomon A1 catheter placed in the mid right common carotid artery. A 

DSA right carotid arteriogram consisting of 2 views of the 

bifurcation and two intracranial views were performed. Next the 

catheter was placed in the mid left common carotid artery and a DSA 

left carotid arteriogram consisted of 2 views the bifurcation and two 

intracranial views performed.



An angled Glidewire and Salomon A1 catheter were advanced into the left 

external carotid artery.  A 6 Danish 90 cm Raabe sheath was placed in 

the mid to distal left common carotid artery. Heparin 6000 units IV 

and nitroglycerin into 250 mcg aliquots were given. The stenosis in 

the proximal left ICA was crossed with a large Abbott filter wire. 

The filter was successfully deployed in a straight segment of the 

left ICA at the level of C1. Good apposition was noted. Pre 

dilatation was not performed. Next a tapered 9-7 x 4 cm Exact nitinol 

stent was deployed from the terminal left common carotid artery in to 

the proximal internal carotid artery. After deployment the stenosis 

was dilated with a 5 mm x 3 cm balloon. Completion arteriograms were 

obtained. The filter wire was retrieved. The sheath was removed and 

hemostasis obtained with a Perclose device. The patient tolerated the 

procedure well without an obvious neurologic deficit.



FINDINGS:

The arch arteriogram demonstrates the 3 great vessels to be widely 

patent without a radiographically significant stenosis. The vertebral 

arteries are patent with antegrade flow.



The left carotid origin is status post endarterectomy. There is a 80% 

focal stenosis in the proximal left internal carotid artery 1 cm from 

its origin. The petrous and cavernous segments of the left ICA are 

widely patent. The left M1 and A1 segments are normal. No evidence of 

significant intracranial occlusive disease or aneurym is noted.



There is a 50-70 percent calcified stenosis is the proximal right 

internal carotid artery.  The petrous and cavernous segments of the 

right ICA are patent. The right M1 and A1 segments are normal. No 

evidence of significant intracranial occlusive disease or aneurysm is 

noted.



IMPRESSION:

1. 80 percent smooth stenosis in the proximal left internal carotid 

artery. 



2. Successful  angioplasty and stent placement at the left carotid 

bifurcation with filter wire protection as described above.

## 2018-04-12 NOTE — CP.PCM.CON
<Thuan Villarreal - Last Filed: 18 16:01>





History of Present Illness





- History of Present Illness


History of Present Illness: 





Consult note for ICU


Patient is a 57 year old female with a history of Diabetes mellitus, peripheral 

vascular disease, hyperlipidemia, hypertension, seizures, arhtirits, 

gastroesophageal reflux disease, right below knee amputation, and left and 

right carotid artery stenosis who presents status post carotid stenting.





Vascular surgeon:Dr. Longoria


Family history: Mother: cancer


Past surgical history: L carotid endartectomy, BKA 16, 2  sections, 

cholecystectomy, appendectomy


Social history: 1 ppd x 43 years, denies alcohol or drug abuse


Allergies: Vancomycin








Review of Systems





- Constitutional


Constitutional: absent: Anorexia, Chills, Fever





- EENT


Eyes: absent: Blurred Vision, Change in Vision





- Cardiovascular


Cardiovascular: absent: Chest Pain, Dyspnea





- Respiratory


Respiratory: absent: Cough, Dyspnea, Wheezing





- Gastrointestinal


Gastrointestinal: absent: Diarrhea, Nausea, Vomiting





- Genitourinary


Genitourinary: absent: Dysuria





- Integumentary


Integumentary: absent: Skin Pain, Unusual Bruising





- Neurological


Neurological: absent: Dizziness, Numbness





- Psychiatric


Psychiatric: absent: Anxiety, Change in Appetite





- Endocrine


Endocrine: absent: Fatigue, Palpitations





- Hematologic/Lymphatic


Hematologic: absent: Easy Bleeding, Easy Bruising





Past Patient History





- Infectious Disease


Hx of Infectious Diseases: None





- Past Medical History & Family History


Past Medical History?: Yes





- Past Social History


Smoking Status: Heavy Smoker > 10 Cigarettes Daily





- CARDIAC


Hx Pacemaker: No





- PULMONARY


Hx Asthma: Yes





- NEUROLOGICAL


Hx Paralysis: No





- HEENT


Hx HEENT Problems: No





- RENAL


Hx Chronic Kidney Disease: No





- ENDOCRINE/METABOLIC


Hx Diabetes Mellitus Type 2: Yes





- HEMATOLOGICAL/ONCOLOGICAL


Hx Blood Transfusions: No





- INTEGUMENTARY


Hx Dermatological Problems: No





- MUSCULOSKELETAL/RHEUMATOLOGICAL


Hx Musculoskeletal Disorders: No





- GASTROINTESTINAL


Hx Gastrointestinal Disorders: No


Hx Gastroesophageal Reflux: Yes





- GENITOURINARY/GYNECOLOGICAL


Hx Sexually Transmitted Disorders: No





- PSYCHIATRIC


Hx Emotional Abuse: No


Hx Physical Abuse: No


Hx Substance Use: No





- SURGICAL HISTORY


Hx Surgeries: Yes





- ANESTHESIA


Hx Anesthesia Reactions: No


Hx Malignant Hyperthermia: No





Meds


Allergies/Adverse Reactions: 


 Allergies











Allergy/AdvReac Type Severity Reaction Status Date / Time


 


vancomycin AdvReac  ITCHING Verified 18 04:39














- Medications


Medications: 


 Current Medications





Amlodipine Besylate (Norvasc)  10 mg PO DAILY [facility]


Atorvastatin Calcium (Lipitor)  10 mg PO DIN [facility]


Clopidogrel Bisulfate (Plavix)  75 mg PO DAILY [facility]


Divalproex Sodium (Depakote Dr (*Bid*))  750 mg PO BID [facility]


   PRN Reason: Protocol


Duloxetine HCl (Cymbalta)  20 mg PO DAILY [facility]


Hydrochlorothiazide (Hydrodiuril)  25 mg PO DAILY [facility]


Sodium Chloride (Sodium Chloride 0.45%)  1,000 mls @ 80 mls/hr IV .[id number] [facility]


   Last Admin: 18 14:26 Dose:  80 mls/hr


Insulin Detemir (Levemir)  25 unit SC HS [facility]


Insulin Human Lispro (Humalog Med)  0 units SC AC [facility]


Ondansetron HCl (Zofran Inj)  4 mg IVP ONCE PRN


   PRN Reason: Nausea/Vomiting


Oxycodone/Acetaminophen (Percocet 5/325 Mg Tab)  1 tab PO Q4H PRN


   PRN Reason: Pain, moderate (4-7)


   Stop: 04/15/18 12:43


   Last Admin: 18 14:22 Dose:  1 tab


Pantoprazole Sodium (Protonix Ec Tab)  40 mg PO DAILY [facility]


Pregabalin (Lyrica)  100 mg PO TID [facility]


Sitagliptin Phosphate (Januvia)  100 mg PO DAILY [facility]











Physical Exam





- Constitutional


Appears: No Acute Distress





- Head Exam


Head Exam: ATRAUMATIC, NORMAL INSPECTION, NORMOCEPHALIC





- Eye Exam


Eye Exam: EOMI, Normal appearance





- ENT Exam


ENT Exam: Mucous Membranes Moist, Normal Exam





- Neck Exam


Neck exam: Positive for: Normal Inspection





- Respiratory Exam


Respiratory Exam: Clear to Auscultation Bilateral, NORMAL BREATHING PATTERN





- Cardiovascular Exam


Cardiovascular Exam: REGULAR RHYTHM, +S1, +S2





- GI/Abdominal Exam


GI & Abdominal Exam: Normal Bowel Sounds, Soft





- Extremities Exam


Additional comments: 





Right BKA, bruising on lower extremities bilaterally


Site of cath insertion on left groin is patent, no bleeding or discharge





- Back Exam


Back exam: NORMAL INSPECTION





- Neurological Exam


Neurological exam: Alert, Oriented x3





- Psychiatric Exam


Psychiatric exam: Normal Affect, Normal Mood





Results





- Vital Signs


Recent Vital Signs: 


 Last Vital Signs











Temp  97.3 F L  18 13:20


 


Pulse  65   18 15:10


 


Resp  20   18 15:10


 


BP  127/62   18 15:00


 


Pulse Ox  97   18 15:10














- Labs


Labs: 


 Laboratory Results - last 24 hr











  18





  07:37 07:37 14:35


 


POC Glucose (mg/dL)    237 H


 


Triglycerides   308 H 


 


Cholesterol   234 H 


 


LDL Cholesterol Direct   119 


 


HDL Cholesterol   37 


 


Blood Type  A POSITIVE  


 


Antibody Screen  Negative  


 


BBK History Checked  Patient has bt  














Assessment & Plan





- Assessment and Plan (Free Text)


Assessment: 





Patient is a 57 year old female with a history of  left and right carotid 

artery stenosis who presents status post carotid stenting.


Plan: 





Neurologic/Psych


-Patient is alert awake and oriented x 3


-is responsive and able to communicate


-Continue with depakote for history of seizures


-Continue with cymbalta and pregabalin


-Percocet for pain control





Cardiovascular


-Will restart norvasc and HCTZ tomorrow for patient's history of hypertension. 

Patient currently normotensive will continue to monitor


-Lipid panel reveals triglycerides 308, ; continue with lipitor


-S/p carotid stent; patient will be started on plavix


-Left Carotid/Vert duplex US ordered for morning


-Continue to assess cath insertion  for bleeding, hematoma formation

, bruising


-Continue to monitor distal pulses





Endocrine


-Glucose currently 237; continue with levemir and ISS med 


-monitor and keep euglycemic





Gastroenterology


-Consistent carbohydrate diet


-Zofran for nausea


-Protonix for GI prophylaxis 





Infectious disease


-MRSA screen ordered


-monitor cath site for drainage


-afebrile, continue to monitor














<Carlos Justice - Last Filed: 18 17:04>





Meds





- Medications


Medications: 


 Current Medications





Amlodipine Besylate (Norvasc)  10 mg PO DAILY [facility]


Aspirin (Ecotrin)  81 mg PO DAILY [facility]


Atorvastatin Calcium (Lipitor)  10 mg PO DIN [facility]


Clopidogrel Bisulfate (Plavix)  75 mg PO DAILY [facility]


Divalproex Sodium (Depakote Dr (*Bid*))  750 mg PO BID [facility]


   PRN Reason: Protocol


Duloxetine HCl (Cymbalta)  20 mg PO DAILY [facility]


Heparin Sodium (Porcine) (Heparin)  5,000 units SC Q12 [facility]


   PRN Reason: Protocol


Hydrochlorothiazide (Hydrodiuril)  25 mg PO DAILY [facility]


Sodium Chloride (Sodium Chloride 0.45%)  1,000 mls @ 80 mls/hr IV .[id number] [facility]


   Last Admin: 18 14:26 Dose:  80 mls/hr


Insulin Detemir (Levemir)  25 unit SC HS [facility]


Insulin Human Lispro (Humalog Med)  0 units SC AC [facility]


Ondansetron HCl (Zofran Inj)  4 mg IVP ONCE PRN


   PRN Reason: Nausea/Vomiting


Oxycodone/Acetaminophen (Percocet 5/325 Mg Tab)  1 tab PO Q4H PRN


   PRN Reason: Pain, moderate (4-7)


   Stop: 04/15/18 12:43


   Last Admin: 18 14:22 Dose:  1 tab


Pantoprazole Sodium (Protonix Ec Tab)  40 mg PO DAILY [facility]


Pregabalin (Lyrica)  100 mg PO TID [facility]


   Last Admin: 18 15:59 Dose:  100 mg


Sitagliptin Phosphate (Januvia)  100 mg PO DAILY [facility]











Results





- Vital Signs


Recent Vital Signs: 


 Last Vital Signs











Temp  98 F   18 13:40


 


Pulse  70   18 16:01


 


Resp  17   18 16:01


 


BP  125/65   18 16:01


 


Pulse Ox  97   18 16:01














- Labs


Labs: 


 Laboratory Results - last 24 hr











  18





  07:37 07:37 14:35


 


POC Glucose (mg/dL)    237 H


 


Triglycerides   308 H 


 


Cholesterol   234 H 


 


LDL Cholesterol Direct   119 


 


HDL Cholesterol   37 


 


Blood Type  A POSITIVE  


 


Antibody Screen  Negative  


 


BBK History Checked  Patient has bt  














Assessment & Plan





- Assessment and Plan (Free Text)


Plan: 


Patient seen and examined, on rounds with resident, agree with note with 

following additions/exceptions:


Patient is 58yo female, PMHx of HTN, HLD, smoker, R BKA, presents s/p carotid 

stenting with IR and vascular. Currently afebrile, HD stable, comfortable, in 

NAD. Cont with ASA, Plavix, BP control, IVF hydration, monitor access site, GI 

ppx, DVT ppx. Stable

## 2018-04-13 VITALS
RESPIRATION RATE: 28 BRPM | SYSTOLIC BLOOD PRESSURE: 115 MMHG | OXYGEN SATURATION: 94 % | HEART RATE: 74 BPM | DIASTOLIC BLOOD PRESSURE: 56 MMHG

## 2018-04-13 VITALS — TEMPERATURE: 98.3 F

## 2018-04-13 LAB
ALBUMIN SERPL-MCNC: 3.1 G/DL (ref 3–4.8)
ALBUMIN/GLOB SERPL: 0.9 {RATIO} (ref 1.1–1.8)
ALT SERPL-CCNC: 52 U/L (ref 7–56)
AST SERPL-CCNC: 51 U/L (ref 14–36)
BUN SERPL-MCNC: 20 MG/DL (ref 7–21)
CALCIUM SERPL-MCNC: 9.1 MG/DL (ref 8.4–10.5)
ERYTHROCYTE [DISTWIDTH] IN BLOOD BY AUTOMATED COUNT: 13.4 % (ref 11.5–14.5)
GFR NON-AFRICAN AMERICAN: > 60
HGB BLD-MCNC: 12.1 G/DL (ref 12–16)
MCH RBC QN AUTO: 31.6 PG (ref 25–35)
MCHC RBC AUTO-ENTMCNC: 33 G/DL (ref 31–37)
MCV RBC AUTO: 95.8 FL (ref 80–105)
PLATELET # BLD: 105 10^3/UL (ref 120–450)
PMV BLD AUTO: 12 FL (ref 7–11)
RBC # BLD AUTO: 3.83 10^6/UL (ref 3.5–6.1)
WBC # BLD AUTO: 5.6 10^3/UL (ref 4.5–11)

## 2018-04-13 RX ADMIN — OXYCODONE HYDROCHLORIDE AND ACETAMINOPHEN PRN TAB: 5; 325 TABLET ORAL at 11:54

## 2018-04-13 RX ADMIN — INSULIN LISPRO SCH UNITS: 100 INJECTION, SOLUTION INTRAVENOUS; SUBCUTANEOUS at 07:59

## 2018-04-13 RX ADMIN — DIVALPROEX SODIUM SCH MG: 250 TABLET, DELAYED RELEASE ORAL at 10:50

## 2018-04-13 RX ADMIN — OXYCODONE HYDROCHLORIDE AND ACETAMINOPHEN PRN TAB: 5; 325 TABLET ORAL at 07:37

## 2018-04-13 RX ADMIN — INSULIN LISPRO SCH UNITS: 100 INJECTION, SOLUTION INTRAVENOUS; SUBCUTANEOUS at 11:50

## 2018-04-13 NOTE — US
PROCEDURE:  Left carotid artery duplex ultrasound 



HISTORY:

Recurrent left CEA stenosis. Status post stent and angioplasty.  

Baseline study



PHYSICIAN(S):  Yoni Martinez MD.



TECHNIQUE:

Duplex sonography and color-flow Doppler were used to evaluate the 

left carotid bifurcation and limited segments of the left vertebral 

artery 



FINDINGS:

The self expanding stent is well positioned at the left ICA origin. 

Good wall apposition is seen.  There is no evidence of thrombus or 

dissection. 



Normal systolic velocities are seen within the stent. PSV in the 

stent is 106 cm/second. 



Mildly elevated velocities are noted in the proximal left external 

carotid artery. 



There is antegrade flow in the left vertebral artery. 



IMPRESSION:

1. Widely patent proximal left ICA stent

## 2018-08-10 ENCOUNTER — HOSPITAL ENCOUNTER (OUTPATIENT)
Dept: HOSPITAL 14 - H.OPSURG | Age: 58
Discharge: HOME | End: 2018-08-10
Attending: ANESTHESIOLOGY
Payer: MEDICARE

## 2018-08-10 VITALS
SYSTOLIC BLOOD PRESSURE: 126 MMHG | TEMPERATURE: 98.7 F | HEART RATE: 73 BPM | RESPIRATION RATE: 16 BRPM | DIASTOLIC BLOOD PRESSURE: 61 MMHG

## 2018-08-10 VITALS — OXYGEN SATURATION: 96 %

## 2018-08-10 VITALS — BODY MASS INDEX: 28.8 KG/M2

## 2018-08-10 DIAGNOSIS — M16.12: ICD-10-CM

## 2018-08-10 DIAGNOSIS — I10: ICD-10-CM

## 2018-08-10 DIAGNOSIS — E78.5: ICD-10-CM

## 2018-08-10 DIAGNOSIS — G40.909: ICD-10-CM

## 2018-08-10 DIAGNOSIS — E11.9: ICD-10-CM

## 2018-08-10 DIAGNOSIS — I73.9: ICD-10-CM

## 2018-08-10 DIAGNOSIS — F32.9: ICD-10-CM

## 2018-08-10 DIAGNOSIS — M17.12: Primary | ICD-10-CM

## 2018-08-10 PROCEDURE — 20610 DRAIN/INJ JOINT/BURSA W/O US: CPT

## 2018-08-10 PROCEDURE — 82948 REAGENT STRIP/BLOOD GLUCOSE: CPT

## 2018-10-12 ENCOUNTER — HOSPITAL ENCOUNTER (OUTPATIENT)
Dept: HOSPITAL 14 - H.OPSURG | Age: 58
Discharge: HOME | End: 2018-10-12
Attending: ANESTHESIOLOGY
Payer: MEDICARE

## 2018-10-12 VITALS — BODY MASS INDEX: 39 KG/M2

## 2018-10-12 VITALS — DIASTOLIC BLOOD PRESSURE: 60 MMHG | OXYGEN SATURATION: 96 % | HEART RATE: 67 BPM | SYSTOLIC BLOOD PRESSURE: 122 MMHG

## 2018-10-12 VITALS — RESPIRATION RATE: 18 BRPM

## 2018-10-12 VITALS — TEMPERATURE: 97.7 F

## 2018-10-12 DIAGNOSIS — G40.909: ICD-10-CM

## 2018-10-12 DIAGNOSIS — E11.9: ICD-10-CM

## 2018-10-12 DIAGNOSIS — M17.12: ICD-10-CM

## 2018-10-12 DIAGNOSIS — M16.12: Primary | ICD-10-CM

## 2018-10-12 DIAGNOSIS — F17.200: ICD-10-CM

## 2018-10-12 DIAGNOSIS — I11.0: ICD-10-CM

## 2018-10-12 DIAGNOSIS — I50.9: ICD-10-CM

## 2018-10-12 PROCEDURE — 82948 REAGENT STRIP/BLOOD GLUCOSE: CPT

## 2018-10-12 PROCEDURE — 20610 DRAIN/INJ JOINT/BURSA W/O US: CPT

## 2018-11-16 ENCOUNTER — HOSPITAL ENCOUNTER (EMERGENCY)
Dept: HOSPITAL 14 - H.ER | Age: 58
Discharge: HOME | End: 2018-11-16
Payer: MEDICARE

## 2018-11-16 VITALS
OXYGEN SATURATION: 97 % | RESPIRATION RATE: 18 BRPM | DIASTOLIC BLOOD PRESSURE: 63 MMHG | HEART RATE: 71 BPM | SYSTOLIC BLOOD PRESSURE: 143 MMHG

## 2018-11-16 VITALS — BODY MASS INDEX: 39 KG/M2

## 2018-11-16 VITALS — TEMPERATURE: 98.3 F

## 2018-11-16 DIAGNOSIS — J45.909: ICD-10-CM

## 2018-11-16 DIAGNOSIS — G89.29: ICD-10-CM

## 2018-11-16 DIAGNOSIS — E11.9: ICD-10-CM

## 2018-11-16 DIAGNOSIS — R04.0: Primary | ICD-10-CM

## 2018-11-16 DIAGNOSIS — Z88.1: ICD-10-CM

## 2018-11-16 DIAGNOSIS — I11.0: ICD-10-CM

## 2018-11-16 DIAGNOSIS — Z79.4: ICD-10-CM

## 2018-11-16 LAB
ALBUMIN SERPL-MCNC: 3.9 G/DL (ref 3.5–5)
ALBUMIN/GLOB SERPL: 0.9 {RATIO} (ref 1–2.1)
ALT SERPL-CCNC: 47 U/L (ref 9–52)
APTT BLD: 37.8 SECONDS (ref 25.6–37.1)
AST SERPL-CCNC: 52 U/L (ref 14–36)
BASOPHILS # BLD AUTO: 0 K/UL (ref 0–0.2)
BASOPHILS NFR BLD: 0.7 % (ref 0–2)
BUN SERPL-MCNC: 26 MG/DL (ref 7–17)
CALCIUM SERPL-MCNC: 8.9 MG/DL (ref 8.4–10.2)
EOSINOPHIL # BLD AUTO: 0.1 K/UL (ref 0–0.7)
EOSINOPHIL NFR BLD: 2.1 % (ref 0–4)
ERYTHROCYTE [DISTWIDTH] IN BLOOD BY AUTOMATED COUNT: 13.6 % (ref 11.5–14.5)
GFR NON-AFRICAN AMERICAN: 51
HGB BLD-MCNC: 12.7 G/DL (ref 12–16)
INR PPP: 1
LYMPHOCYTES # BLD AUTO: 1.2 K/UL (ref 1–4.3)
LYMPHOCYTES NFR BLD AUTO: 18.3 % (ref 20–40)
MCH RBC QN AUTO: 33.4 PG (ref 27–31)
MCHC RBC AUTO-ENTMCNC: 33.7 G/DL (ref 33–37)
MCV RBC AUTO: 99.2 FL (ref 81–99)
MONOCYTES # BLD: 0.7 K/UL (ref 0–0.8)
MONOCYTES NFR BLD: 11.2 % (ref 0–10)
NEUTROPHILS # BLD: 4.5 K/UL (ref 1.8–7)
NEUTROPHILS NFR BLD AUTO: 67.7 % (ref 50–75)
NRBC BLD AUTO-RTO: 0 % (ref 0–0)
PLATELET # BLD: 124 K/UL (ref 130–400)
PMV BLD AUTO: 10.8 FL (ref 7.2–11.7)
PROTHROMBIN TIME: 11.5 SECONDS (ref 9.8–13.1)
RBC # BLD AUTO: 3.81 MIL/UL (ref 3.8–5.2)
WBC # BLD AUTO: 6.6 K/UL (ref 4.8–10.8)

## 2018-11-16 PROCEDURE — 82948 REAGENT STRIP/BLOOD GLUCOSE: CPT

## 2018-11-16 PROCEDURE — 85730 THROMBOPLASTIN TIME PARTIAL: CPT

## 2018-11-16 PROCEDURE — 80053 COMPREHEN METABOLIC PANEL: CPT

## 2018-11-16 PROCEDURE — 85025 COMPLETE CBC W/AUTO DIFF WBC: CPT

## 2018-11-16 PROCEDURE — 99284 EMERGENCY DEPT VISIT MOD MDM: CPT

## 2018-11-16 PROCEDURE — 85610 PROTHROMBIN TIME: CPT

## 2019-01-25 ENCOUNTER — HOSPITAL ENCOUNTER (OUTPATIENT)
Dept: HOSPITAL 14 - H.OPSURG | Age: 59
Discharge: HOME | End: 2019-01-25
Attending: ANESTHESIOLOGY
Payer: MEDICARE

## 2019-01-25 VITALS — BODY MASS INDEX: 30.7 KG/M2

## 2019-01-25 VITALS — HEART RATE: 64 BPM

## 2019-01-25 VITALS — DIASTOLIC BLOOD PRESSURE: 76 MMHG | SYSTOLIC BLOOD PRESSURE: 122 MMHG | TEMPERATURE: 97.4 F

## 2019-01-25 VITALS — RESPIRATION RATE: 18 BRPM | OXYGEN SATURATION: 100 %

## 2019-01-25 DIAGNOSIS — G40.909: ICD-10-CM

## 2019-01-25 DIAGNOSIS — I11.0: ICD-10-CM

## 2019-01-25 DIAGNOSIS — I50.9: ICD-10-CM

## 2019-01-25 DIAGNOSIS — M19.90: ICD-10-CM

## 2019-01-25 DIAGNOSIS — J45.909: ICD-10-CM

## 2019-01-25 DIAGNOSIS — E78.5: ICD-10-CM

## 2019-01-25 DIAGNOSIS — R45.851: ICD-10-CM

## 2019-01-25 DIAGNOSIS — E11.51: ICD-10-CM

## 2019-01-25 DIAGNOSIS — D64.9: ICD-10-CM

## 2019-01-25 DIAGNOSIS — M54.16: Primary | ICD-10-CM

## 2019-01-25 DIAGNOSIS — K21.9: ICD-10-CM

## 2019-01-25 PROCEDURE — 64484 NJX AA&/STRD TFRM EPI L/S EA: CPT

## 2019-01-25 PROCEDURE — 82948 REAGENT STRIP/BLOOD GLUCOSE: CPT

## 2019-01-25 PROCEDURE — 64483 NJX AA&/STRD TFRM EPI L/S 1: CPT

## 2019-05-29 ENCOUNTER — HOSPITAL ENCOUNTER (OUTPATIENT)
Dept: HOSPITAL 14 - H.OPSURG | Age: 59
Discharge: HOME | End: 2019-05-29
Attending: ANESTHESIOLOGY
Payer: MEDICARE

## 2019-05-29 VITALS
HEART RATE: 65 BPM | DIASTOLIC BLOOD PRESSURE: 67 MMHG | SYSTOLIC BLOOD PRESSURE: 137 MMHG | TEMPERATURE: 98.1 F | OXYGEN SATURATION: 96 %

## 2019-05-29 VITALS — BODY MASS INDEX: 28.1 KG/M2

## 2019-05-29 VITALS — RESPIRATION RATE: 16 BRPM

## 2019-05-29 DIAGNOSIS — E11.9: ICD-10-CM

## 2019-05-29 DIAGNOSIS — G90.522: Primary | ICD-10-CM

## 2019-05-29 DIAGNOSIS — I10: ICD-10-CM

## 2019-05-29 DIAGNOSIS — Z79.4: ICD-10-CM

## 2019-05-29 DIAGNOSIS — Z86.73: ICD-10-CM

## 2019-05-29 DIAGNOSIS — I25.10: ICD-10-CM

## 2019-05-29 LAB
BUN SERPL-MCNC: 16 MG/DL (ref 7–17)
CALCIUM SERPL-MCNC: 8.7 MG/DL (ref 8.4–10.2)
ERYTHROCYTE [DISTWIDTH] IN BLOOD BY AUTOMATED COUNT: 14.6 % (ref 11.5–14.5)
GFR NON-AFRICAN AMERICAN: > 60
HGB BLD-MCNC: 11.3 G/DL (ref 12–16)
MCH RBC QN AUTO: 34.4 PG (ref 27–31)
MCHC RBC AUTO-ENTMCNC: 34.3 G/DL (ref 33–37)
MCV RBC AUTO: 100.4 FL (ref 81–99)
PLATELET # BLD: 109 K/UL (ref 130–400)
RBC # BLD AUTO: 3.28 MIL/UL (ref 3.8–5.2)
WBC # BLD AUTO: 6.6 K/UL (ref 4.8–10.8)

## 2019-05-29 PROCEDURE — 64520 N BLOCK LUMBAR/THORACIC: CPT

## 2019-05-29 PROCEDURE — 82948 REAGENT STRIP/BLOOD GLUCOSE: CPT

## 2019-05-29 PROCEDURE — 36415 COLL VENOUS BLD VENIPUNCTURE: CPT

## 2019-05-29 PROCEDURE — 85027 COMPLETE CBC AUTOMATED: CPT

## 2019-05-29 PROCEDURE — 77003 FLUOROGUIDE FOR SPINE INJECT: CPT

## 2019-05-29 PROCEDURE — 80048 BASIC METABOLIC PNL TOTAL CA: CPT

## 2019-05-29 RX ADMIN — DEXAMETHASONE SODIUM PHOSPHATE ONE MG: 4 INJECTION, SOLUTION INTRAMUSCULAR; INTRAVENOUS at 13:02

## 2019-05-29 RX ADMIN — BUPIVACAINE HYDROCHLORIDE ONE ML: 2.5 INJECTION, SOLUTION EPIDURAL; INFILTRATION; INTRACAUDAL; PERINEURAL at 13:01

## 2019-05-29 RX ADMIN — BUPIVACAINE HYDROCHLORIDE ONE ML: 2.5 INJECTION, SOLUTION EPIDURAL; INFILTRATION; INTRACAUDAL; PERINEURAL at 13:10

## 2019-05-29 RX ADMIN — DEXAMETHASONE SODIUM PHOSPHATE ONE MG: 4 INJECTION, SOLUTION INTRAMUSCULAR; INTRAVENOUS at 13:11
